# Patient Record
Sex: FEMALE | Race: WHITE | NOT HISPANIC OR LATINO | URBAN - METROPOLITAN AREA
[De-identification: names, ages, dates, MRNs, and addresses within clinical notes are randomized per-mention and may not be internally consistent; named-entity substitution may affect disease eponyms.]

---

## 2017-03-30 ENCOUNTER — HOSPITAL ENCOUNTER (EMERGENCY)
Facility: CLINIC | Age: 32
Discharge: HOME | End: 2017-03-30
Attending: EMERGENCY MEDICINE | Admitting: EMERGENCY MEDICINE
Payer: COMMERCIAL

## 2017-03-30 VITALS
OXYGEN SATURATION: 97 % | DIASTOLIC BLOOD PRESSURE: 87 MMHG | SYSTOLIC BLOOD PRESSURE: 132 MMHG | RESPIRATION RATE: 18 BRPM | TEMPERATURE: 98 F | WEIGHT: 115.5 LBS | HEART RATE: 138 BPM

## 2017-03-30 DIAGNOSIS — F11.20 UNCOMPLICATED OPIOID DEPENDENCE (H): ICD-10-CM

## 2017-03-30 LAB
AMPHETAMINES UR QL SCN: NORMAL
ANION GAP SERPL CALCULATED.3IONS-SCNC: 8 MMOL/L (ref 3–14)
BARBITURATES UR QL: NORMAL
BENZODIAZ UR QL: NORMAL
BUN SERPL-MCNC: 9 MG/DL (ref 7–30)
CALCIUM SERPL-MCNC: 9.6 MG/DL (ref 8.5–10.1)
CANNABINOIDS UR QL SCN: NORMAL
CHLORIDE SERPL-SCNC: 108 MMOL/L (ref 94–109)
CK SERPL-CCNC: 49 U/L (ref 30–225)
CO2 SERPL-SCNC: 27 MMOL/L (ref 20–32)
COCAINE UR QL: NORMAL
CREAT SERPL-MCNC: 0.64 MG/DL (ref 0.52–1.04)
ETHANOL UR QL SCN: NORMAL
GFR SERPL CREATININE-BSD FRML MDRD: NORMAL ML/MIN/1.7M2
GLUCOSE SERPL-MCNC: 96 MG/DL (ref 70–99)
HCG SERPL QL: NEGATIVE
HCG UR QL: NEGATIVE
OPIATES UR QL SCN: NORMAL
POTASSIUM SERPL-SCNC: 3.5 MMOL/L (ref 3.4–5.3)
SODIUM SERPL-SCNC: 143 MMOL/L (ref 133–144)

## 2017-03-30 PROCEDURE — 96361 HYDRATE IV INFUSION ADD-ON: CPT | Mod: 59

## 2017-03-30 PROCEDURE — 80320 DRUG SCREEN QUANTALCOHOLS: CPT | Performed by: EMERGENCY MEDICINE

## 2017-03-30 PROCEDURE — 96375 TX/PRO/DX INJ NEW DRUG ADDON: CPT

## 2017-03-30 PROCEDURE — 80307 DRUG TEST PRSMV CHEM ANLYZR: CPT | Performed by: EMERGENCY MEDICINE

## 2017-03-30 PROCEDURE — 99284 EMERGENCY DEPT VISIT MOD MDM: CPT | Mod: 25

## 2017-03-30 PROCEDURE — 80048 BASIC METABOLIC PNL TOTAL CA: CPT | Performed by: EMERGENCY MEDICINE

## 2017-03-30 PROCEDURE — 25000132 ZZH RX MED GY IP 250 OP 250 PS 637: Performed by: EMERGENCY MEDICINE

## 2017-03-30 PROCEDURE — S0166 INJ OLANZAPINE 2.5MG: HCPCS | Performed by: EMERGENCY MEDICINE

## 2017-03-30 PROCEDURE — 82550 ASSAY OF CK (CPK): CPT | Performed by: EMERGENCY MEDICINE

## 2017-03-30 PROCEDURE — 99284 EMERGENCY DEPT VISIT MOD MDM: CPT | Mod: Z6 | Performed by: EMERGENCY MEDICINE

## 2017-03-30 PROCEDURE — 81025 URINE PREGNANCY TEST: CPT | Performed by: EMERGENCY MEDICINE

## 2017-03-30 PROCEDURE — 25000125 ZZHC RX 250: Performed by: EMERGENCY MEDICINE

## 2017-03-30 PROCEDURE — 25000128 H RX IP 250 OP 636: Performed by: EMERGENCY MEDICINE

## 2017-03-30 PROCEDURE — 96374 THER/PROPH/DIAG INJ IV PUSH: CPT

## 2017-03-30 PROCEDURE — 84703 CHORIONIC GONADOTROPIN ASSAY: CPT | Performed by: EMERGENCY MEDICINE

## 2017-03-30 RX ORDER — IBUPROFEN 600 MG/1
600 TABLET, FILM COATED ORAL ONCE
Status: COMPLETED | OUTPATIENT
Start: 2017-03-30 | End: 2017-03-30

## 2017-03-30 RX ORDER — CLONIDINE HYDROCHLORIDE 0.1 MG/1
0.1 TABLET ORAL 2 TIMES DAILY
Qty: 4 TABLET | Refills: 1 | Status: SHIPPED | OUTPATIENT
Start: 2017-03-30 | End: 2017-05-01

## 2017-03-30 RX ORDER — ONDANSETRON 2 MG/ML
8 INJECTION INTRAMUSCULAR; INTRAVENOUS EVERY 30 MIN PRN
Status: DISCONTINUED | OUTPATIENT
Start: 2017-03-30 | End: 2017-03-30 | Stop reason: HOSPADM

## 2017-03-30 RX ORDER — OLANZAPINE 10 MG/2ML
10 INJECTION, POWDER, FOR SOLUTION INTRAMUSCULAR ONCE
Status: COMPLETED | OUTPATIENT
Start: 2017-03-30 | End: 2017-03-30

## 2017-03-30 RX ORDER — ACETAMINOPHEN 325 MG/1
975 TABLET ORAL ONCE
Status: COMPLETED | OUTPATIENT
Start: 2017-03-30 | End: 2017-03-30

## 2017-03-30 RX ORDER — ONDANSETRON 4 MG/1
4 TABLET, ORALLY DISINTEGRATING ORAL EVERY 6 HOURS PRN
Qty: 10 TABLET | Refills: 0 | Status: SHIPPED | OUTPATIENT
Start: 2017-03-30 | End: 2017-04-02

## 2017-03-30 RX ADMIN — IBUPROFEN 600 MG: 600 TABLET ORAL at 19:16

## 2017-03-30 RX ADMIN — SODIUM CHLORIDE 1000 ML: 9 INJECTION, SOLUTION INTRAVENOUS at 19:16

## 2017-03-30 RX ADMIN — ONDANSETRON 8 MG: 2 INJECTION INTRAMUSCULAR; INTRAVENOUS at 19:17

## 2017-03-30 RX ADMIN — ACETAMINOPHEN 975 MG: 325 TABLET, FILM COATED ORAL at 19:16

## 2017-03-30 RX ADMIN — OLANZAPINE 10 MG: 10 INJECTION, POWDER, LYOPHILIZED, FOR SOLUTION INTRAMUSCULAR at 19:25

## 2017-03-30 NOTE — ED NOTES
Patient presents with elevated HR, generalized muscle aches, nausea and restlessness and states she is withdrawing from Heroine; patient stated she last used at 6:00am today; patient verbalized she started using Heroine 1 year ago; denies all other drug use and denies alcohol use; patient verbalized history of anxiety and depressing and denies suicidal thoughts; continuous cardiac monitor and pulse oxymeter applied.

## 2017-03-30 NOTE — ED AVS SNAPSHOT
[facility], [place], Emergency Department    [address]    [facility] [place] [place] [address]    Phone:  [phone]    Fax:  [phone]                                       Karina Jenkins   MRN: [id number]    Department:  [facility], Emergency Department   Date of Visit:  3/30/2017           After Visit Summary Signature Page     I have received my discharge instructions, and my questions have been answered. I have discussed any challenges I see with this plan with the nurse or doctor.    ..........................................................................................................................................  Patient/Patient Representative Signature      ..........................................................................................................................................  Patient Representative Print Name and Relationship to Patient    ..................................................               ................................................  Date                                            Time    ..........................................................................................................................................  Reviewed by Signature/Title    ...................................................              ..............................................  Date                                                            Time

## 2017-03-30 NOTE — ED AVS SNAPSHOT
[facility], Emergency Department    [address]    [place] [address]    Phone:  [phone]    Fax:  [phone]                                       Karina Jenkins   MRN: [id number]    Department:  [facility], Emergency Department   Date of Visit:  3/30/2017           Patient Information     Date Of Birth          1985        Your diagnoses for this visit were:     Uncomplicated opioid dependence (H)        You were seen by Baltazar Trujillo MD.        Discharge Instructions       You came to the ED with heroin withdrawal symptoms.  We had no detox beds available tonight.    Call the [facility] center line at the below number tomorrow morning at 0830.  See if they can save a detox bed for you.    [facility] (alcohol and narcotics) [phone]  Take zofran every 6 hours as needed for vomiting    Take clonidine twice a day as needed for anxiety and shakjes  Rule 25, Detox and Treatment Resources  FUNDING FOR DETOX AND/OR TREATMENT  If you cannot afford treatment, or if your insurance (i.e. Medical Assistance, [facility], [facility]) does not pay for treatment, you will need a Rule 25 assessment.      Contact the number based on which county you live in to obtain a Rule 25 Assessment.   [facility]  [phone]  [facility] [phone]  [facility] [phone]  [facility]  -- [address]. [phone]  -- [facility] [phone]  --  Family Services [phone]  -- [facility] [phone]  -- CLUES (se habla español) [phone]  [facility] [phone]  -- CLUES (se habla español) [phone]  [facility] [phone]  [facility] [phone]    DETOX CENTERS   Withdrawal from alcohol or benzos can lead to medical emergencies.   Detoxing is a 2-3 day stay at a facility where you can safely stop using your drug of choice.   [facility] (alcohol and narcotics) [phone]   **Call to reserve a bed before  coming in to the [facility].   [facility] (alcohol only) [phone]  [facility] (alcohol only) [phone]  [facility] (all substances) [phone]  [facility] (all substances) [phone]   TREATMENT FACILITIES  Programs that help people face their addictions and develop new tools to move forward.   Must be fully detoxed before entering.   For Adults For Minors   [employer] [phone]  [facility] [phone]  [facility] [phone]  [facility] [phone]  [place] [facility] [phone]  [facility] [phone]  [facility] [phone]  [facility] [phone]  [facility] [phone]  [place] [phone]  [place] [phone]  [facility] [phone]  [address] [phone]  [facility] (LGBT focus)               [phone]  [facility] (cognitive-impairment)      [phone]   [facility] [phone]  [place] [phone]  [facility] [phone]  [facility] [phone]  [facility] [phone]  [place] [phone]  [place] [phone]  [place] [phone]  [facility] [phone]    [place] [facility] Comprehensive Assessment program  [phone]         Please make an appointment to follow up with [facility] (phone: [phone]) if you need a primary care doctor        24 Hour Appointment Hotline       To make an appointment at any [facility] clinic, call [phone] ([phone]). If you don't have a family doctor or clinic, we will help you find one. [facility] clinics are conveniently located to serve the needs of you and your family.             Review of your medicines      START taking        Dose / Directions Last dose taken    cloNIDine 0.1 MG tablet   Commonly known as:  CATAPRES   Dose:  0.1 mg   Quantity:  4 tablet        Take 1 tablet (0.1 mg) by mouth 2 times daily   Refills:  1        ondansetron 4 MG ODT tab   Commonly known as:  ZOFRAN ODT   Dose:  4 mg   Quantity:  10 tablet        Take 1 tablet (4  "mg) by mouth every 6 hours as needed for nausea   Refills:  0          Our records show that you are taking the medicines listed below. If these are incorrect, please call your family doctor or clinic.        Dose / Directions Last dose taken    PREVACID PO        Refills:  0        ZOFRAN PO        Refills:  0                Prescriptions were sent or printed at these locations (2 Prescriptions)                   Other Prescriptions                Printed at Department/Unit printer (2 of 2)         ondansetron (ZOFRAN ODT) 4 MG ODT tab               cloNIDine (CATAPRES) 0.1 MG tablet                Procedures and tests performed during your visit     Basic metabolic panel    CK total    Drug abuse screen 6 urine (chem dep)    HCG qualitative    HCG qualitative urine      Orders Needing Specimen Collection     None      Pending Results     No orders found from 3/28/2017 to 3/31/2017.            Pending Culture Results     No orders found from 3/28/2017 to 3/31/2017.            Thank you for choosing [facility]       Thank you for choosing [facility] for your care. Our goal is always to provide you with excellent care. Hearing back from our patients is one way we can continue to improve our services. Please take a few minutes to complete the written survey that you may receive in the mail after you visit with us. Thank you!        [employer] Information     [employer] lets you send messages to your doctor, view your test results, renew your prescriptions, schedule appointments and more. To sign up, go to [email or web address] . Click on \"Log in\" on the left side of the screen, which will take you to the Welcome page. Then click on \"Sign up Now\" on the right side of the page.     You will be asked to enter the access code listed below, as well as some personal information. Please follow the directions to create your username and password.     Your access code is: [email or web address]  Expires: 6/28/2017  9:42 PM     Your access code " will  in 90 days. If you need help or a new code, please call your [facility] clinic or [phone].        Care EveryWhere ID     This is your Care EveryWhere ID. This could be used by other organizations to access your [facility] medical records  [id number]        After Visit Summary       This is your record. Keep this with you and show to your community pharmacist(s) and doctor(s) at your next visit.

## 2017-03-30 NOTE — ED PROVIDER NOTES
History     Chief Complaint   Patient presents with     Addiction Problem     Pt c/o having withdrawl from opiate. Pt want detox and treatment     HPI  Karina Jenkins is a 31 year old female with history of depression and anxiety who presents to the ED today seeking detox from heroin. Patient reports 1 gram of daily heroin use via snorting a smoking for the past 1 year. Last use was 6 AM. She denies IV drug use. She denies any other drug or alcohol use. She does smoke cigarettes. She denies previous detox or CD treatment admissions. Here in the ED she endorses body aches, restlessness, nausea, hot and cold chills secondary to withdrawal. She denies any skin rashes or sores. Patient denies history of DM.  No fevers.  No hx of IVDU related infections.  No specific back pains, just aches everywhere.  No cough/CP/SOB.    I have reviewed the Medications, Allergies, Past Medical and Surgical History, and Social History in the [facility] system.    PAST MEDICAL HISTORY:   Past Medical History:   Diagnosis Date     Allergic state      Back pain      Migraine        PAST SURGICAL HISTORY: History reviewed. No pertinent surgical history.    FAMILY HISTORY: No family history on file.    SOCIAL HISTORY:   Social History   Substance Use Topics     Smoking status: Current Every Day Smoker     Packs/day: 0.50     Smokeless tobacco: Not on file     Alcohol use Yes     No current facility-administered medications for this encounter.      Current Outpatient Prescriptions   Medication     ondansetron (ZOFRAN ODT) 4 MG ODT tab     cloNIDine (CATAPRES) 0.1 MG tablet     Lansoprazole (PREVACID PO)     Ondansetron HCl (ZOFRAN PO)        Allergies   Allergen Reactions     Vicodin [Hydrocodone-Acetaminophen] Itching         Review of Systems   Constitutional: Positive for chills. Negative for fever.   HENT: Positive for rhinorrhea. Negative for congestion and sore throat.    Eyes: Negative for visual disturbance.   Respiratory: Negative  for cough and shortness of breath.    Cardiovascular: Negative for chest pain.   Gastrointestinal: Positive for nausea. Negative for abdominal pain, diarrhea and vomiting.   Genitourinary: Negative for flank pain.   Musculoskeletal: Positive for myalgias. Negative for joint swelling.   Skin: Negative for rash.   Neurological: Negative for syncope and headaches.   Psychiatric/Behavioral: The patient is nervous/anxious.    All other systems reviewed and are negative.      Physical Exam   BP: 120/88  Pulse: 138  Temp: 99.4  F (37.4  C)  Resp: 16  Weight: 52.4 kg (115 lb 8 oz)  SpO2: 100 %  Physical Exam   Constitutional: She is oriented to person, place, and time. She appears well-developed and well-nourished. No distress.   HENT:   Head: Normocephalic and atraumatic.   Mouth/Throat: Oropharynx is clear and moist. No oropharyngeal exudate.   Eyes: Conjunctivae and EOM are normal. Pupils are equal, round, and reactive to light.   Neck: Normal range of motion. Neck supple.   Cardiovascular: Regular rhythm, normal heart sounds and intact distal pulses.    No murmur heard.  tachycardic   Pulmonary/Chest: Effort normal and breath sounds normal. No respiratory distress. She has no wheezes. She has no rales.   Abdominal: Soft. Bowel sounds are normal. She exhibits no distension. There is no tenderness. There is no rebound and no guarding.   No CVAT   Musculoskeletal: Normal range of motion. She exhibits no edema or tenderness.   Neurological: She is alert and oriented to person, place, and time. She exhibits normal muscle tone.   Normal senstation/strength in legs   Skin: Skin is warm and dry. She is not diaphoretic.   Psychiatric: Her behavior is normal. Judgment and thought content normal. Her mood appears anxious.   Nursing note and vitals reviewed.      ED Course     ED Course     Procedures       6:50 PM  The patient was seen and examined by Dr. Trujillo in Room 20.         Critical Care time:             Labs Ordered  and Resulted from Time of ED Arrival Up to the Time of Departure from the ED   BASIC METABOLIC PANEL   CK TOTAL   HCG QUALITATIVE   DRUG ABUSE SCREEN 6 CHEM DEP URINE ([facility])   HCG QUALITATIVE URINE       Assessments & Plan (with Medical Decision Making)     31 year old female with history of depression and anxiety who presents to the ED today seeking detox from heroin.   Afebrile.  Tachycardic on presentation, though HR ~ 120 on my exam.  Concern for opioid withdrawal vs dehydration vs sepsis vs occult infection.  No s/s of PNA, UTI, epidural abscess, or other infection at this time.  Labs revealeed no AGMA to suggest lactic acidosis.  Tachycardia rapidly improved with fluids.  No electrolyte abnormalities.  CK normal doubt rhabdo.  Patient's symptoms improved with zofran, APAP, motrin, and zyprexa.  No inpatient detox beds available.  Patient's symptoms improved and she was discharged.  She was given contact information for detox centers.  Left int he company of her boyfriend.      ddx- as above    I have reviewed the nursing notes.    I have reviewed the findings, diagnosis, plan and need for follow up with the patient.    Discharge Medication List as of 3/30/2017  9:43 PM      START taking these medications    Details   ondansetron (ZOFRAN ODT) 4 MG ODT tab Take 1 tablet (4 mg) by mouth every 6 hours as needed for nausea, Disp-10 tablet, R-0, Local Print      cloNIDine (CATAPRES) 0.1 MG tablet Take 1 tablet (0.1 mg) by mouth 2 times daily, Disp-4 tablet, R-1, Local Print             Final diagnoses:   Uncomplicated opioid dependence (H)     I,Roman Crouch , am serving as a trained medical scribe to document services personally performed by Baltazar Trujillo MD, based on the provider's statements to me.   IBaltazar MD, was physically present and have reviewed and verified the accuracy of this note documented by Roman Crouch.    3/30/2017   [facility], [place], EMERGENCY DEPARTMENT     Baltazar Trujillo,  MD  03/31/17 0142

## 2017-03-31 ASSESSMENT — ENCOUNTER SYMPTOMS
CHILLS: 1
FLANK PAIN: 0
HEADACHES: 0
JOINT SWELLING: 0
VOMITING: 0
SHORTNESS OF BREATH: 0
DIARRHEA: 0
COUGH: 0
SORE THROAT: 0
FEVER: 0
RHINORRHEA: 1
NERVOUS/ANXIOUS: 1
MYALGIAS: 1
NAUSEA: 1
ABDOMINAL PAIN: 0

## 2017-03-31 NOTE — DISCHARGE INSTRUCTIONS
You came to the ED with heroin withdrawal symptoms.  We had no detox beds available tonight.    Call the [facility] center line at the below number tomorrow morning at 0830.  See if they can save a detox bed for you.    [facility] (alcohol and narcotics) [phone]  Take zofran every 6 hours as needed for vomiting    Take clonidine twice a day as needed for anxiety and shakjes  Rule 25, Detox and Treatment Resources  FUNDING FOR DETOX AND/OR TREATMENT  If you cannot afford treatment, or if your insurance (i.e. Medical Assistance, [facility], [facility]) does not pay for treatment, you will need a Rule 25 assessment.      Contact the number based on which county you live in to obtain a Rule 25 Assessment.   [facility]  [phone]  [facility] [phone]  [facility] [phone]  [facility]  -- [address] [facility] [phone]  -- [facility] [phone]  --  Family Services [phone]  -- [facility] [phone]  -- CLUES (se habla español) [phone]  [facility] [phone]  -- CLUES (se habla español) [phone]  [facility] [phone]  [facility] [phone]    DETOX CENTERS   Withdrawal from alcohol or benzos can lead to medical emergencies.   Detoxing is a 2-3 day stay at a facility where you can safely stop using your drug of choice.   [facility] (alcohol and narcotics) [phone]   **Call to reserve a bed before coming in to the [facility].   [facility] (alcohol only) [phone]  [facility] (alcohol only) [phone]  [facility] (all substances) [phone]  [facility] (all substances) [phone]   TREATMENT FACILITIES  Programs that help people face their addictions and develop new tools to move forward.   Must be fully detoxed before entering.   For Adults For Minors   [place] [phone]  [facility] [phone]  [place] [phone]  [facility] [phone]  [place]  [facility] [phone]  [facility] [phone]  [facility] [phone]  [place] [phone]  [facility] [phone]  [place] [phone]  [place] [phone]  [facility] [phone]  [place] [phone]  [facility] (LGBT focus)               [phone]  [facility] (cognitive-impairment)      [phone]   [facility] [phone]  [place] [phone]  [facility] [phone]  [facility] [phone]  [facility] [phone]  [place] [phone]  [place] [phone]  [place] [phone]  [facility] [phone]    [place] [facility] Comprehensive Assessment program  [phone]         Please make an appointment to follow up with [facility] (phone: [phone]) if you need a primary care doctor
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
## 2017-04-28 ENCOUNTER — OFFICE VISIT (OUTPATIENT)
Dept: FAMILY MEDICINE | Facility: CLINIC | Age: 32
End: 2017-04-28
Payer: COMMERCIAL

## 2017-04-28 ENCOUNTER — RESULT FOLLOW UP (OUTPATIENT)
Dept: FAMILY MEDICINE | Facility: CLINIC | Age: 32
End: 2017-04-28

## 2017-04-28 VITALS
SYSTOLIC BLOOD PRESSURE: 119 MMHG | OXYGEN SATURATION: 99 % | BODY MASS INDEX: 21.75 KG/M2 | TEMPERATURE: 98.2 F | WEIGHT: 127.4 LBS | HEART RATE: 94 BPM | HEIGHT: 64 IN | DIASTOLIC BLOOD PRESSURE: 78 MMHG

## 2017-04-28 DIAGNOSIS — Z00.00 ROUTINE GENERAL MEDICAL EXAMINATION AT A HEALTH CARE FACILITY: Primary | ICD-10-CM

## 2017-04-28 DIAGNOSIS — Z72.0 TOBACCO USE: ICD-10-CM

## 2017-04-28 DIAGNOSIS — N87.0 DYSPLASIA OF CERVIX, LOW GRADE (CIN 1): ICD-10-CM

## 2017-04-28 DIAGNOSIS — Z13.220 LIPID SCREENING: ICD-10-CM

## 2017-04-28 DIAGNOSIS — F19.10 MULTIPLE SUBSTANCE ABUSE (H): ICD-10-CM

## 2017-04-28 DIAGNOSIS — G89.4 CHRONIC PAIN SYNDROME: ICD-10-CM

## 2017-04-28 DIAGNOSIS — Z12.4 CERVICAL CANCER SCREENING: ICD-10-CM

## 2017-04-28 DIAGNOSIS — Z13.1 SCREENING FOR DIABETES MELLITUS: ICD-10-CM

## 2017-04-28 PROBLEM — F11.20 HEROIN ADDICTION (H): Status: ACTIVE | Noted: 2017-04-28

## 2017-04-28 LAB
CHOLEST SERPL-MCNC: 195 MG/DL
GLUCOSE SERPL-MCNC: 91 MG/DL (ref 70–99)
HDLC SERPL-MCNC: 70 MG/DL
LDLC SERPL CALC-MCNC: 96 MG/DL
NONHDLC SERPL-MCNC: 125 MG/DL
TRIGL SERPL-MCNC: 144 MG/DL

## 2017-04-28 PROCEDURE — 36415 COLL VENOUS BLD VENIPUNCTURE: CPT | Performed by: FAMILY MEDICINE

## 2017-04-28 PROCEDURE — G0145 SCR C/V CYTO,THINLAYER,RESCR: HCPCS | Performed by: FAMILY MEDICINE

## 2017-04-28 PROCEDURE — 99214 OFFICE O/P EST MOD 30 MIN: CPT | Mod: 25 | Performed by: FAMILY MEDICINE

## 2017-04-28 PROCEDURE — G0124 SCREEN C/V THIN LAYER BY MD: HCPCS | Performed by: FAMILY MEDICINE

## 2017-04-28 PROCEDURE — 80061 LIPID PANEL: CPT | Performed by: FAMILY MEDICINE

## 2017-04-28 PROCEDURE — 87624 HPV HI-RISK TYP POOLED RSLT: CPT | Performed by: FAMILY MEDICINE

## 2017-04-28 PROCEDURE — 82947 ASSAY GLUCOSE BLOOD QUANT: CPT | Performed by: FAMILY MEDICINE

## 2017-04-28 PROCEDURE — 99385 PREV VISIT NEW AGE 18-39: CPT | Performed by: FAMILY MEDICINE

## 2017-04-28 RX ORDER — GABAPENTIN 300 MG/1
CAPSULE ORAL
Qty: 90 CAPSULE | Refills: 3 | Status: SHIPPED | OUTPATIENT
Start: 2017-04-28 | End: 2017-12-08

## 2017-04-28 ASSESSMENT — ANXIETY QUESTIONNAIRES
3. WORRYING TOO MUCH ABOUT DIFFERENT THINGS: NEARLY EVERY DAY
IF YOU CHECKED OFF ANY PROBLEMS ON THIS QUESTIONNAIRE, HOW DIFFICULT HAVE THESE PROBLEMS MADE IT FOR YOU TO DO YOUR WORK, TAKE CARE OF THINGS AT HOME, OR GET ALONG WITH OTHER PEOPLE: NOT DIFFICULT AT ALL
6. BECOMING EASILY ANNOYED OR IRRITABLE: NEARLY EVERY DAY
GAD7 TOTAL SCORE: 17
1. FEELING NERVOUS, ANXIOUS, OR ON EDGE: MORE THAN HALF THE DAYS
2. NOT BEING ABLE TO STOP OR CONTROL WORRYING: MORE THAN HALF THE DAYS
5. BEING SO RESTLESS THAT IT IS HARD TO SIT STILL: MORE THAN HALF THE DAYS
7. FEELING AFRAID AS IF SOMETHING AWFUL MIGHT HAPPEN: MORE THAN HALF THE DAYS

## 2017-04-28 ASSESSMENT — PATIENT HEALTH QUESTIONNAIRE - PHQ9: 5. POOR APPETITE OR OVEREATING: NEARLY EVERY DAY

## 2017-04-28 NOTE — LETTER
[facility]  [address] [place] [address]  [phone]        May 1, 2017      Karina Jenkins  [address] [place] [address]        Ms. Jenkins       Your recent labs showed:     Normal fasting blood glucose. This means that you have no evidence of diabetes at this time.     Cholesterol panel was normal as well.         Results for orders placed or performed in visit on 04/28/17   Glucose   Result Value Ref Range    Glucose 91 70 - 99 mg/dL   Lipid Profile   Result Value Ref Range    Cholesterol 195 <200 mg/dL    Triglycerides 144 <150 mg/dL    HDL Cholesterol 70 >49 mg/dL    LDL Cholesterol Calculated 96 <100 mg/dL    Non HDL Cholesterol 125 <130 mg/dL           If you have any questions or concerns, please call myself or my nurse at [phone].    Sincerely,    /cassy

## 2017-04-28 NOTE — MR AVS SNAPSHOT
After Visit Summary   4/28/2017    Karina Jenkins    MRN: [id number]           Patient Information     Date Of Birth          1985        Visit Information        Provider Department      4/28/2017 1:30 PM Kelly Perez MD [facility]        Today's Diagnoses     Routine general medical examination at a health care facility    -  1    Cervical cancer screening        Lipid screening        Screening for diabetes mellitus        Chronic pain syndrome        Multiple substance abuse        Tobacco use          Care Instructions      Preventive Health Recommendations  Female Ages 26 - 39  Yearly exam:   See your health care provider every year in order to    Review health changes.     Discuss preventive care.      Review your medicines if you your doctor has prescribed any.    Until age 30: Get a Pap test every three years (more often if you have had an abnormal result).    After age 30: Talk to your doctor about whether you should have a Pap test every 3 years or have a Pap test with HPV screening every 5 years.   You do not need a Pap test if your uterus was removed (hysterectomy) and you have not had cancer.  You should be tested each year for STDs (sexually transmitted diseases), if you're at risk.   Talk to your provider about how often to have your cholesterol checked.  If you are at risk for diabetes, you should have a diabetes test (fasting glucose).  Shots: Get a flu shot each year. Get a tetanus shot every 10 years.   Nutrition:     Eat at least 5 servings of fruits and vegetables each day.    Eat whole-grain bread, whole-wheat pasta and brown rice instead of white grains and rice.    Talk to your provider about Calcium and Vitamin D.     Lifestyle    Exercise at least 150 minutes a week (30 minutes a day, 5 days of the week). This will help you control your weight and prevent disease.    Limit alcohol to one drink per day.    No smoking.     Wear sunscreen to prevent  skin cancer.    See your dentist every six months for an exam and cleaning.          Follow-ups after your visit        Additional Services     PAIN MANAGEMENT CENTER ([place]) REFERRAL       Your provider has referred you to the [facility].    Reason for Referral: Comprehensive Evaluation and Management    Please complete the following questions:    What is your diagnosis for the patient's pain? Chronic pain syndrome    Do you have any specific questions for the pain specialist? No    Are there any red flags that may impact the assessment or management of the patient? Active or recent alcohol, drug or prescription medication misuse (explain): heroin addiction and marijuana use    **ANY DIAGNOSTIC TESTS THAT ARE NOT IN EPIC SHOULD BE SENT TO THE PAIN CENTER**    Please note the Pre-Op Pain Consult must be scheduled 2-3 weeks prior to the patient's surgery.  Patient's trying to schedule within 2 weeks of surgery may not be accommodated.     Pre-Op Pain Consults are only good for 30 days.    REGARDING OPIOID MEDICATIONS:  We will always address appropriateness of opioid pain medications, but we generally will not automatically take on a prescribing role. When we do take on prescribing of opioids for chronic pain, it is in collaboration with the referring physician for an intermediate period of time (months), with an expectation that the primary physician or provider will assume the prescribing role if medications are effective at stable doses with demonstrated compliance.  Therefore, please do not assume that your prescribing responsibilities end on the day of pain clinic consultation.  Is this agreeable to you? YES    For any questions, contact the [facility] at [phone].    Please be aware that coverage of these services is subject to the terms and limitations of your health insurance plan.  Call member services at your health plan with any benefit or coverage  "questions.      Please bring the following with you to your appointment:    (1) Any X-Rays, CTs or MRIs which have been performed.  Contact the facility where they were done to arrange for  prior to your scheduled appointment.    (2) List of current medications   (3) This referral request   (4) Any documents/labs given to you for this referral                  Follow-up notes from your care team     Return in about 1 year (around 2018) for Physical Exam and as needed throughout the year.      Who to contact     Normal or non-critical lab and imaging results will be communicated to you by Zenaminshart, letter or phone within 4 business days after the clinic has received the results. If you do not hear from us within 7 days, please contact the clinic through iTwint or phone. If you have a critical or abnormal lab result, we will notify you by phone as soon as possible.  Submit refill requests through [email or web address] or call your pharmacy and they will forward the refill request to us. Please allow 3 business days for your refill to be completed.          If you need to speak with a  for additional information , please call: [phone]             Additional Information About Your Visit        [email or web address] Information     [email or web address] lets you send messages to your doctor, view your test results, renew your prescriptions, schedule appointments and more. To sign up, go to [email or web address] . Click on \"Log in\" on the left side of the screen, which will take you to the Welcome page. Then click on \"Sign up Now\" on the right side of the page.     You will be asked to enter the access code listed below, as well as some personal information. Please follow the directions to create your username and password.     Your access code is: [email or web address]  Expires: 2017  9:42 PM     Your access code will  in 90 days. If you need help or a new code, please call your [place] clinic or [phone].        Care " "EveryWhere ID     This is your Care EveryWhere ID. This could be used by other organizations to access your [facility] medical records  [id number]        Your Vitals Were     Pulse Temperature Height Last Period Pulse Oximetry Breastfeeding?    94 98.2  F (36.8  C) (Tympanic) 5' 3.5\" (1.613 m) 10/08/2016 99% No    BMI (Body Mass Index)                   22.21 kg/m2            Blood Pressure from Last 3 Encounters:   04/28/17 119/78   03/30/17 132/87   07/01/15 128/78    Weight from Last 3 Encounters:   04/28/17 127 lb 6.4 oz (57.8 kg)   03/30/17 115 lb 8 oz (52.4 kg)   07/01/15 126 lb (57.2 kg)              We Performed the Following     Glucose     Lipid Profile     PAIN MANAGEMENT CENTER ([place]) REFERRAL     Pap imaged thin layer screen reflex to HPV if ASCUS - recommend age 25 - 29          Today's Medication Changes          These changes are accurate as of: 4/28/17  2:30 PM.  If you have any questions, ask your nurse or doctor.               Start taking these medicines.        Dose/Directions    gabapentin 300 MG capsule   Commonly known as:  NEURONTIN   Used for:  Chronic pain syndrome   Started by:  Kelyl Perez MD        Take 1 tablet (300 mg) every night for 1-3 days, then 1 tablet twice daily for 1-3 days, then 1 tablet three times daily   Quantity:  90 capsule   Refills:  3            Where to get your medicines      These medications [facility] to [facility] [address] [address] [place] [address]  [address] [facility] [address]    Hours:  24-hours Phone:  [phone]     gabapentin 300 MG capsule                Primary Care Provider    Physician No Ref-Primary       No address on file        Thank you!     Thank you for choosing [facility]  for your care. Our goal is always to provide you with excellent care. Hearing back from our patients is one way we can continue to improve our services. Please take a few minutes to " complete the written survey that you may receive in the mail after your visit with us. Thank you!             Your Updated Medication List - Protect others around you: Learn how to safely use, store and throw away your medicines at www.disposemymeds.org.          This list is accurate as of: 4/28/17  2:30 PM.  Always use your most recent med list.                   Brand Name Dispense Instructions for use    cloNIDine 0.1 MG tablet    CATAPRES    4 tablet    Take 1 tablet (0.1 mg) by mouth 2 times daily       gabapentin 300 MG capsule    NEURONTIN    90 capsule    Take 1 tablet (300 mg) every night for 1-3 days, then 1 tablet twice daily for 1-3 days, then 1 tablet three times daily       PREVACID PO          ZOFRAN PO

## 2017-04-28 NOTE — LETTER
May 30, 2018      Karina Jenkins  [address] [place] [address]    Dear ,      At [facility], your health and wellness is our primary concern. That is why we are following up on a colposcopy from 6/12/17, which was reported as EWELINA 1. Your provider had recommended that you have a Pap smear and HPV test completed by 6/12/18. Our records do not show that this has been scheduled.    It is important to complete the follow up that your provider has suggested for you to ensure that there are no worsening changes which may, over time, develop into cancer.      Please contact our office at  [phone] to schedule an appointment for a Pap smear and HPV test at your earliest convenience. If you have questions or concerns, please call the clinic and we will be happy to assist you.    If you have completed the tests outside of [facility], please have the results forwarded to our office. We will update the chart for your primary Physician to review before your next annual physical.     Thank you for choosing [facility]!    Sincerely,      Kelly Perez MD/radha

## 2017-04-28 NOTE — LETTER
December 12, 2018      Karina Jenkins  [address] [place] [address]    Dear ,      At [facility], your health and wellness is our primary concern. That is why we are following up on a colposcopy from 6/12/17, which was reported as EWELINA 1. Your provider had recommended that you have a Pap smear and HPV test completed by 6/12/18. Our records do not show that this has been done.    It is important to complete the follow up that your provider has suggested for you to ensure that there are no worsening changes which may, over time, develop into cancer.      Please contact our office at  [phone] to schedule an appointment for a Pap smear and HPV test at your earliest convenience. If you have questions or concerns, please call the clinic and we will be happy to assist you.    If you have completed the tests outside of [facility], please have the results forwarded to our office. We will update the chart for your primary Physician to review before your next annual physical.     Thank you for choosing [facility]!    Sincerely,      Kelly Perez MD/[facility]

## 2017-04-28 NOTE — LETTER
July 13, 2017      Karina Mac Kellogg  [address] [address]    Dear ,      This letter is to inform you that your recent Colposcopy biopsy was negative and your ECC results showed EWELINA 1 or mild dysplasia.    You should repeat a pap smear and HPV test in 1 year, unless otherwise directed.      You will also need to return to the clinic every year for your annual exam and other preventive tests.     Please contact the clinic at [phone] if you have further questions.       Sincerely,      Kelly Perez MD/radha

## 2017-04-28 NOTE — NURSING NOTE
"Chief Complaint   Patient presents with     Physical       Initial /78  Pulse 94  Temp 98.2  F (36.8  C) (Tympanic)  Ht 5' 3.5\" (1.613 m)  Wt 127 lb 6.4 oz (57.8 kg)  LMP 10/08/2016  SpO2 99%  Breastfeeding? No  BMI 22.21 kg/m2 Estimated body mass index is 22.21 kg/(m^2) as calculated from the following:    Height as of this encounter: 5' 3.5\" (1.613 m).    Weight as of this encounter: 127 lb 6.4 oz (57.8 kg).  Medication Reconciliation: complete     Forms pt brought into clinic were completed and faxed to number given 5/1/17.  Forms were sent to be scanned into chart.     Trena Miles MA      "

## 2017-04-29 ASSESSMENT — PATIENT HEALTH QUESTIONNAIRE - PHQ9: SUM OF ALL RESPONSES TO PHQ QUESTIONS 1-9: 18

## 2017-04-29 ASSESSMENT — ANXIETY QUESTIONNAIRES: GAD7 TOTAL SCORE: 17

## 2017-05-01 NOTE — PROGRESS NOTES
Please send a result letter on clinic letterhead with results along with the following instructions      Ms. Jenkins      Your recent labs showed:     Normal fasting blood glucose. This means that you have no evidence of diabetes at this time.     Cholesterol panel was normal as well.      Please contact the clinic if you have any additional questions or concerns.    Sincerely,      Kelly Perez M.D    [facility]

## 2017-05-02 ENCOUNTER — TELEPHONE (OUTPATIENT)
Dept: BEHAVIORAL HEALTH | Facility: OTHER | Age: 32
End: 2017-05-02

## 2017-05-02 NOTE — TELEPHONE ENCOUNTER
Writer called pt, no answer. LVM requesting a call back for an appt. Writer will attempt again this week and next week.     Brooklynn Vargas

## 2017-05-02 NOTE — TELEPHONE ENCOUNTER
Please schedule appointment for patient with Addiction Medicine provider for chronic back pain/ongoing opioid use  -please schedule with Dr. Arndt.

## 2017-05-02 NOTE — TELEPHONE ENCOUNTER
----- Message from Felicity Gilbert sent at 5/2/2017  8:58 AM CDT -----  Regarding: Addiction Med Referral  Please review.

## 2017-05-03 LAB
COPATH REPORT: ABNORMAL
PAP: ABNORMAL

## 2017-05-04 LAB
FINAL DIAGNOSIS: ABNORMAL
HPV HR 12 DNA CVX QL NAA+PROBE: POSITIVE
HPV16 DNA SPEC QL NAA+PROBE: NEGATIVE
HPV18 DNA SPEC QL NAA+PROBE: NEGATIVE
SPECIMEN DESCRIPTION: ABNORMAL

## 2017-05-05 NOTE — TELEPHONE ENCOUNTER
Writer spoke with pt; she is scheduled to see Dr Arndt 05/08/17. Writer is closing this encounter.     Brooklynn Vargas

## 2017-05-08 PROBLEM — R87.610 ASCUS WITH POSITIVE HIGH RISK HPV CERVICAL: Status: ACTIVE | Noted: 2017-04-28

## 2017-05-08 PROBLEM — R87.810 ASCUS WITH POSITIVE HIGH RISK HPV CERVICAL: Status: ACTIVE | Noted: 2017-04-28

## 2017-05-08 NOTE — PROGRESS NOTES
4/28/17: ASCUS Pap, + HR HPV (Neg 16/18). Plan colp  5/8/17: Pt notified of result. GYN will call pt to schedule a colp. Pt is being admitted into treatment tomorrow for 30 days.   6/12/17: [place] Bx - negative, ECC - EWELINA 1. Plan cotest in 1 year.   6/14/17 Message left to return call.   6/27/17 Message left on home and mobile to return call.   7/13/17 [place] Result letter sent per RN request (rlm)  5/30/18 Cotest reminder letter sent (rl)  12/12/18 Phone number not in service, additional letter sent ([facility])  12/26/18 Patient is lost to follow-up. Routed to provider as JACI. (rlm)

## 2017-06-12 ENCOUNTER — OFFICE VISIT (OUTPATIENT)
Dept: OBGYN | Facility: CLINIC | Age: 32
End: 2017-06-12
Payer: MEDICAID

## 2017-06-12 VITALS
HEART RATE: 88 BPM | TEMPERATURE: 98.6 F | BODY MASS INDEX: 23.36 KG/M2 | SYSTOLIC BLOOD PRESSURE: 127 MMHG | WEIGHT: 134 LBS | OXYGEN SATURATION: 98 % | DIASTOLIC BLOOD PRESSURE: 88 MMHG

## 2017-06-12 DIAGNOSIS — R87.610 ASCUS WITH POSITIVE HIGH RISK HPV CERVICAL: ICD-10-CM

## 2017-06-12 DIAGNOSIS — R87.810 ASCUS WITH POSITIVE HIGH RISK HPV CERVICAL: ICD-10-CM

## 2017-06-12 DIAGNOSIS — N91.2 ABSENCE OF MENSTRUATION: Primary | ICD-10-CM

## 2017-06-12 LAB — BETA HCG QUAL IFA URINE: NEGATIVE

## 2017-06-12 PROCEDURE — 84703 CHORIONIC GONADOTROPIN ASSAY: CPT | Performed by: OBSTETRICS & GYNECOLOGY

## 2017-06-12 PROCEDURE — 88305 TISSUE EXAM BY PATHOLOGIST: CPT | Performed by: OBSTETRICS & GYNECOLOGY

## 2017-06-12 PROCEDURE — 99242 OFF/OP CONSLTJ NEW/EST SF 20: CPT | Mod: 25 | Performed by: OBSTETRICS & GYNECOLOGY

## 2017-06-12 PROCEDURE — 57454 BX/CURETT OF CERVIX W/SCOPE: CPT | Performed by: OBSTETRICS & GYNECOLOGY

## 2017-06-12 RX ORDER — MAGNESIUM HYDROXIDE/ALUMINUM HYDROXICE/SIMETHICONE 120; 1200; 1200 MG/30ML; MG/30ML; MG/30ML
15 SUSPENSION ORAL EVERY 4 HOURS PRN
COMMUNITY
End: 2018-01-02

## 2017-06-12 NOTE — MR AVS SNAPSHOT
"              After Visit Summary   2017    Karina Victoriaber    MRN: [id number]           Patient Information     Date Of Birth          1985        Visit Information        Provider Department      2017 2:30 PM Agbeh, Cephas Mawuena, MD [facility]        Today's Diagnoses     Absence of menstruation    -  1    ASCUS with positive high risk HPV cervical           Follow-ups after your visit        Who to contact     If you have questions or need follow up information about today's clinic visit or your schedule please contact [facility] directly at [phone].  Normal or non-critical lab and imaging results will be communicated to you by MyChart, letter or phone within 4 business days after the clinic has received the results. If you do not hear from us within 7 days, please contact the clinic through [employer] or phone. If you have a critical or abnormal lab result, we will notify you by phone as soon as possible.  Submit refill requests through [employer] or call your pharmacy and they will forward the refill request to us. Please allow 3 business days for your refill to be completed.          Additional Information About Your Visit        MyChart Information     [employer] Connect lets you send messages to your doctor, view your test results, renew your prescriptions, schedule appointments and more. To sign up, go to [email or web address] . Click on \"Log in\" on the left side of the screen, which will take you to the Welcome page. Then click on \"Sign up Now\" on the right side of the page.     You will be asked to enter the access code listed below, as well as some personal information. Please follow the directions to create your username and password.     Your access code is: [email or web address]  Expires: 2017  9:42 PM     Your access code will  in 90 days. If you need help or a new code, please call your [facility] or [phone].        Care EveryWhere ID     This is your " Care EveryWhere ID. This could be used by other organizations to access your [facility] medical records  [id number]        Your Vitals Were     Pulse Temperature Last Period Pulse Oximetry BMI (Body Mass Index)       88 98.6  F (37  C) (Tympanic) 05/15/2017 98% 23.36 kg/m2        Blood Pressure from Last 3 Encounters:   06/12/17 127/88   04/28/17 119/78   03/30/17 132/87    Weight from Last 3 Encounters:   06/12/17 134 lb (60.8 kg)   04/28/17 127 lb 6.4 oz (57.8 kg)   03/30/17 115 lb 8 oz (52.4 kg)              We Performed the Following     Beta HCG qual IFA urine     COLP CERVIX/UPPER VAGINA W BX CERVIX/ENDOCERV CURETT     Surgical pathology exam        Primary Care Provider    Physician No Ref-Primary       No address on file        Thank you!     Thank you for choosing [facility]  for your care. Our goal is always to provide you with excellent care. Hearing back from our patients is one way we can continue to improve our services. Please take a few minutes to complete the written survey that you may receive in the mail after your visit with us. Thank you!             Your Updated Medication List - Protect others around you: Learn how to safely use, store and throw away your medicines at www.disposemymeds.org.          This list is accurate as of: 6/12/17  3:16 PM.  Always use your most recent med list.                   Brand Name Dispense Instructions for use    alum & mag hydroxide-simethicone 200-200-20 MG/5ML Susp suspension    MYLANTA/MAALOX     Take 15 mLs by mouth every 4 hours as needed for indigestion       BENADRYL PO          BUSPAR PO      Take 15 mg by mouth       DEPAKOTE PO      Take 500 mg by mouth       FLAGYL PO      Take 500 mg by mouth       gabapentin 300 MG capsule    NEURONTIN    90 capsule    Take 1 tablet (300 mg) every night for 1-3 days, then 1 tablet twice daily for 1-3 days, then 1 tablet three times daily       IBUPROFEN PO          MELATONIN PO      10 mg q hs        PRILOSEC PO      Take 20 mg by mouth       ROBAXIN PO      Take 500 mg by mouth 3 times daily       TYLENOL PO          VISTARIL PO      Take 25 mg by mouth 1-2 prn       ZOFRAN PO      Take by mouth every 6 hours as needed for nausea or vomiting       ZOLOFT PO      Take 25 mg by mouth daily

## 2017-06-12 NOTE — PROGRESS NOTES
Patient Name: Karina Jenkins              Date: 6/12/2017   YOB: 1985                         Age: 32 year old   Phone: [phone] (home)   ________________________________________________________________________  I have been asked to see Karina in consultation by Dr. ROBBINS  to discuss the pap smear, findings and possible further evaluation.  The patient's pap smear on 4/17 showed ASCUS and OTHER HIGH RISK HPV.   I attempted to ensure that the patient was educated regarding the nature of her findings and implications to date.  We reviewed the role of HPV, incidence in the population and the natural history of the infection, and its transmission.  We also reviewed ways to minimize her future risk, the effect of HPV on the cervix and treatment options available, should they be indicated.    The pathophysiology of the cervix, including a discussion of the squamous and columnar cells, metaplasia and dysplasia have been reviewed, drawings, sketches and the pamphlets were reviewed with her.      Patient's last menstrual period was 05/15/2017.  Current Birth Control Method: nothing      Past Medical History:   Diagnosis Date     ASCUS with positive high risk HPV cervical 04/28/2017    Neg 16/18     Chronic back pain      Depression with anxiety      GERD (gastroesophageal reflux disease)      Heroin addiction (H)      Marijuana use, episodic      Migraine      Tobacco use     1/2 PPD       Past Surgical History:   Procedure Laterality Date     NO HISTORY OF SURGERY          Outpatient Encounter Prescriptions as of 6/12/2017   Medication Sig Dispense Refill     Sertraline HCl (ZOLOFT PO) Take 25 mg by mouth daily       Omeprazole (PRILOSEC PO) Take 20 mg by mouth       BusPIRone HCl (BUSPAR PO) Take 15 mg by mouth       Divalproex Sodium (DEPAKOTE PO) Take 500 mg by mouth       HydrOXYzine Pamoate (VISTARIL PO) Take 25 mg by mouth 1-2 prn       Ondansetron HCl (ZOFRAN PO) Take by mouth every 6 hours as  needed for nausea or vomiting       MELATONIN PO 10 mg q hs       DiphenhydrAMINE HCl (BENADRYL PO)        IBUPROFEN PO        Acetaminophen (TYLENOL PO)        alum & mag hydroxide-simethicone (MYLANTA/MAALOX) 200-200-20 MG/5ML SUSP suspension Take 15 mLs by mouth every 4 hours as needed for indigestion       Methocarbamol (ROBAXIN PO) Take 500 mg by mouth 3 times daily       MetroNIDAZOLE (FLAGYL PO) Take 500 mg by mouth       gabapentin (NEURONTIN) 300 MG capsule Take 1 tablet (300 mg) every night for 1-3 days, then 1 tablet twice daily for 1-3 days, then 1 tablet three times daily (Patient not taking: Reported on 6/12/2017) 90 capsule 3     No facility-administered encounter medications on file as of 6/12/2017.         Allergies as of 06/12/2017 - [facility] as Reviewed 06/12/2017   Allergen Reaction Noted     Vicodin [hydrocodone-acetaminophen] Itching 07/01/2015       Social History     Social History     Marital status: Single     Spouse name: N/A     Number of children: N/A     Years of education: N/A     Social History Main Topics     Smoking status: Current Every Day Smoker     Packs/day: 0.50     Smokeless tobacco: None     Alcohol use No     Drug use: Yes     Special: Heroin      Comment: heroin     Sexual activity: Yes     Partners: Male     Birth control/ protection: None     Other Topics Concern     None     Social History Narrative        Family History   Problem Relation Age of Onset     Breast Cancer No family hx of      Colon Cancer No family hx of          Review Of Systems  Skin: negative  Eyes: negative  Ears/Nose/Throat: negative  Respiratory: negative  Cardiovascular: negative  Gastrointestinal: negative  Genitourinary: negative  Musculoskeletal: negative  Neurologic: negative  Psychiatric: negative  Hematologic/Lymphatic/Immunologic: negative  Endocrine: negative     Exam:   /88 (BP Location: Left arm, Patient Position: Sitting, Cuff Size: Adult Regular)  Pulse 88  Temp 98.6  F (37  C)  (Tympanic)  Wt 134 lb (60.8 kg)  LMP 05/15/2017  SpO2 98%  BMI 23.36 kg/m2  GENERAL:  WNWD female NAD  HEENT: NC/AT, EOMI  Lungs:  Good respiratory effort   SKIN: normal skin turgor  GAIT: Normal  NECK: Symmetrical, no masses noted   VULVA: Normal Genitalia  BUS: Normal  URETHRA:  No hypermobility noted  URETHRAL MEATUS:  No masses noted  VAGINA: Normal mucosa, no discharge  CERVIX: Closed, mobile, no discharge  PERIANAL:  No masses or lesions seen  EXTREMITIES: no clubbing, cyanosis, or edema    Assessment:  ASCUS/High Risk HPV    Plan:  Recommend to Proceed with Colpo  The details of the colposcopic procedure were reviewed, the risks of missed diagnoses, pain, infection, and bleeding.    Results for orders placed or performed in visit on 06/12/17   Beta HCG qual IFA urine   Result Value Ref Range    Beta HCG Qual IFA Urine Negative NEG       Procedure:  Procedure for colposcopy and biopsy has been explained to the patient and consent obtained.    Before the procedure, it was ensured that the patient was educated regarding the nature of her findings and implications to date.  We reviewed the role of HPV and the natural history of the infection.  We also reviewed ways to minimize her future risk, the effect of HPV on the cervix and treatment options available, should they be indicated.    The pathophysiology of the cervix, including a discussion of the squamous and columnar cells, metaplasia and dysplasia have been reviewed, drawings, sketches and the pamphlets were reviewed with her.  The details of the colposcopic procedure were reviewed, the risks of missed diagnoses, pain, infection, and bleeding.  Questions seemed to be answered before proceeding and the patient then consented to the procedure.     Speculum placed in vagina and excellent visualization of cervix achieved, cervix swabbed  with acetic acid solution.    biopsies taken (including ECC): 3   Hemostasis effected with Monsel's  solution.    Findings:    No images are attached to the encounter.     Cervix: acetowhitening noted 11,14 oclock  Vaginal inspection: vaginal colposcopy not performed.  Vulvar colposcopy: vulvar colposcopy not performed.   Procedure Summary: Patient tolerated procedure well and colposcopy adequate.      Assessment/Plan:    ICD-10-CM    1. Absence of menstruation N91.2 Beta HCG qual IFA urine     Sertraline HCl (ZOLOFT PO)     Omeprazole (PRILOSEC PO)     BusPIRone HCl (BUSPAR PO)     Divalproex Sodium (DEPAKOTE PO)     HydrOXYzine Pamoate (VISTARIL PO)     Ondansetron HCl (ZOFRAN PO)     MELATONIN PO     DiphenhydrAMINE HCl (BENADRYL PO)     IBUPROFEN PO     Acetaminophen (TYLENOL PO)     alum & mag hydroxide-simethicone (MYLANTA/MAALOX) 200-200-20 MG/5ML SUSP suspension     Methocarbamol (ROBAXIN PO)     MetroNIDAZOLE (FLAGYL PO)     Surgical pathology exam     COLP CERVIX/UPPER VAGINA W BX CERVIX/ENDOCERV CURETT   2. ASCUS with positive high risk HPV cervical R87.610 Beta HCG qual IFA urine    R87.810 Sertraline HCl (ZOLOFT PO)     Omeprazole (PRILOSEC PO)     BusPIRone HCl (BUSPAR PO)     Divalproex Sodium (DEPAKOTE PO)     HydrOXYzine Pamoate (VISTARIL PO)     Ondansetron HCl (ZOFRAN PO)     MELATONIN PO     DiphenhydrAMINE HCl (BENADRYL PO)     IBUPROFEN PO     Acetaminophen (TYLENOL PO)     alum & mag hydroxide-simethicone (MYLANTA/MAALOX) 200-200-20 MG/5ML SUSP suspension     Methocarbamol (ROBAXIN PO)     MetroNIDAZOLE (FLAGYL PO)     Surgical pathology exam     COLP CERVIX/UPPER VAGINA W BX CERVIX/ENDOCERV CURETT       Specimens labelled and sent to pathology.  Will base further treatment on pathology findings.  Post biopsy instructions given to patient and call to discuss Pathology results.  TT 30 min, in addition to the time for the procedure  CT greater than 50%, as noted above in the HPI and in the Plan.       CEPHAS AGBEH, MD.

## 2017-06-12 NOTE — NURSING NOTE
"Chief Complaint   Patient presents with     Colposcopy       Initial /88 (BP Location: Left arm, Patient Position: Sitting, Cuff Size: Adult Regular)  Pulse 88  Temp 98.6  F (37  C) (Tympanic)  Wt 134 lb (60.8 kg)  LMP 05/15/2017  SpO2 98%  BMI 23.36 kg/m2 Estimated body mass index is 23.36 kg/(m^2) as calculated from the following:    Height as of 4/28/17: 5' 3.5\" (1.613 m).    Weight as of this encounter: 134 lb (60.8 kg).  Medication Reconciliation: complete     Stefany Munson LPN    "

## 2017-06-14 PROBLEM — N87.0 DYSPLASIA OF CERVIX, LOW GRADE (CIN 1): Status: ACTIVE | Noted: 2017-04-28

## 2017-06-14 LAB — COPATH REPORT: NORMAL

## 2017-08-03 ENCOUNTER — OFFICE VISIT (OUTPATIENT)
Dept: FAMILY MEDICINE | Facility: CLINIC | Age: 32
End: 2017-08-03
Payer: MEDICAID

## 2017-08-03 VITALS
SYSTOLIC BLOOD PRESSURE: 131 MMHG | HEART RATE: 105 BPM | TEMPERATURE: 98.5 F | OXYGEN SATURATION: 99 % | WEIGHT: 135.8 LBS | DIASTOLIC BLOOD PRESSURE: 87 MMHG | BODY MASS INDEX: 23.68 KG/M2

## 2017-08-03 DIAGNOSIS — F99 INSOMNIA DUE TO OTHER MENTAL DISORDER: ICD-10-CM

## 2017-08-03 DIAGNOSIS — F51.05 INSOMNIA DUE TO OTHER MENTAL DISORDER: ICD-10-CM

## 2017-08-03 DIAGNOSIS — R11.0 NAUSEA: ICD-10-CM

## 2017-08-03 DIAGNOSIS — G43.809 OTHER MIGRAINE WITHOUT STATUS MIGRAINOSUS, NOT INTRACTABLE: Primary | ICD-10-CM

## 2017-08-03 PROCEDURE — 99214 OFFICE O/P EST MOD 30 MIN: CPT | Performed by: NURSE PRACTITIONER

## 2017-08-03 RX ORDER — TRAZODONE HYDROCHLORIDE 100 MG/1
100-200 TABLET ORAL AT BEDTIME
Qty: 90 TABLET | Refills: 0 | Status: SHIPPED | OUTPATIENT
Start: 2017-08-03 | End: 2017-12-08

## 2017-08-03 RX ORDER — ALBUTEROL SULFATE 90 UG/1
2 AEROSOL, METERED RESPIRATORY (INHALATION) EVERY 6 HOURS
COMMUNITY

## 2017-08-03 RX ORDER — SUMATRIPTAN 25 MG/1
25-50 TABLET, FILM COATED ORAL
Qty: 18 TABLET | Refills: 1 | Status: SHIPPED | OUTPATIENT
Start: 2017-08-03

## 2017-08-03 RX ORDER — CHOLECALCIFEROL (VITAMIN D3) 50 MCG
1 TABLET ORAL DAILY
COMMUNITY

## 2017-08-03 RX ORDER — TRAZODONE HYDROCHLORIDE 100 MG/1
100 TABLET ORAL AT BEDTIME
COMMUNITY
End: 2017-08-03

## 2017-08-03 RX ORDER — ONDANSETRON 4 MG/1
4 TABLET, FILM COATED ORAL EVERY 12 HOURS PRN
Qty: 18 TABLET | Refills: 0 | Status: SHIPPED | OUTPATIENT
Start: 2017-08-03 | End: 2017-08-17

## 2017-08-03 ASSESSMENT — ANXIETY QUESTIONNAIRES
GAD7 TOTAL SCORE: 15
5. BEING SO RESTLESS THAT IT IS HARD TO SIT STILL: SEVERAL DAYS
IF YOU CHECKED OFF ANY PROBLEMS ON THIS QUESTIONNAIRE, HOW DIFFICULT HAVE THESE PROBLEMS MADE IT FOR YOU TO DO YOUR WORK, TAKE CARE OF THINGS AT HOME, OR GET ALONG WITH OTHER PEOPLE: SOMEWHAT DIFFICULT
6. BECOMING EASILY ANNOYED OR IRRITABLE: NEARLY EVERY DAY
7. FEELING AFRAID AS IF SOMETHING AWFUL MIGHT HAPPEN: MORE THAN HALF THE DAYS
2. NOT BEING ABLE TO STOP OR CONTROL WORRYING: MORE THAN HALF THE DAYS
3. WORRYING TOO MUCH ABOUT DIFFERENT THINGS: NEARLY EVERY DAY
1. FEELING NERVOUS, ANXIOUS, OR ON EDGE: MORE THAN HALF THE DAYS

## 2017-08-03 ASSESSMENT — PATIENT HEALTH QUESTIONNAIRE - PHQ9: 5. POOR APPETITE OR OVEREATING: MORE THAN HALF THE DAYS

## 2017-08-03 NOTE — NURSING NOTE
"Chief Complaint   Patient presents with     Headache     Sleep Problem       Initial /87  Pulse 105  Temp 98.5  F (36.9  C) (Oral)  Wt 135 lb 12.8 oz (61.6 kg)  SpO2 99%  BMI 23.68 kg/m2 Estimated body mass index is 23.68 kg/(m^2) as calculated from the following:    Height as of 4/28/17: 5' 3.5\" (1.613 m).    Weight as of this encounter: 135 lb 12.8 oz (61.6 kg).  Medication Reconciliation: complete     Lois Aparicio MA  "

## 2017-08-03 NOTE — MR AVS SNAPSHOT
"              After Visit Summary   8/3/2017    Karina Jenkins    MRN: [id number]           Patient Information     Date Of Birth          1985        Visit Information        Provider Department      8/3/2017 1:00 PM Nicole Jose NP [facility]        Today's Diagnoses     Other migraine without status migrainosus, not intractable    -  1    Insomnia due to other mental disorder          Care Instructions      Migraine Headache: Stages and Treatment    A migraine headache tends to progress in stages. Learning these stages can help you better understand what is happening. Then you can learn ways to reduce pain and relieve other symptoms. Methods for relieving your symptoms include self-care and medicines.  Migraine stages  Migraines tend to progress through 4 stages. Many people don't have all stages, and stages may differ with each headache:    Prodrome. A few hours to a day or so before the headache, you may feel tired, (yawning many times), uneasy, or dee. You may also feel bloated or crave certain foods.    Aura. Up to an hour before the headache starts, some migraine sufferers experience aura--flashing lights, blind spots, other vision problems, confusion, difficulty speaking, or other neurologic symptoms.    Headache. Moderate to severe pain affects one side of the head and then can spread to both sides, often along with nausea. You may be highly sensitive to light, sound, and odors. Vomiting or diarrhea may also happen. This stage lasts 4 to 72 hours.    Postdrome. After your headache ends, you may feel tired, achy, and \"washed out.\" This may last for a day or so.  Self-care during a migraine  Here is what you can do:    Use a cold compress. Wrap a thin cloth around a cold pack, a cold can of soda, or a bag of frozen vegetables. Apply this to your temple or other pain site.    Drink fluids. If nausea makes it hard to drink, try sucking on ice.    Rest. If possible, lie down. Try not " to bend over, as this may increase your pain. Sometimes laying in a dark quiet room can help the migraine from being aggravated.      Try caffeine. Some people find that drinking fluids with caffeine, such as coffee or tea, helps to lessen migraine pain.  Using medicines  Work with your healthcare provider to find the right medicines for you. Medicines for migraine may relieve pain (analgesics), relieve nausea, or attack the migraine's root causes (migraine-specific medicines).  Rebound headache  Taking analgesics each day, or even several times a week, may lead to more frequent and severe headaches. These are called rebound headaches. If you think you're having rebound headaches, tell your healthcare provider. He or she can help you safely decrease your medicine. Rebound caffeine withdrawal headaches can also happen.    Date Last Reviewed: 10/9/2015    2262-6690 [employer]. [address] [place] [address], [place] [address]. All rights reserved. This information is not intended as a substitute for professional medical care. Always follow your healthcare professional's instructions.                Follow-ups after your visit        Follow-up notes from your care team     Return if symptoms worsen or fail to improve.      Who to contact     Normal or non-critical lab and imaging results will be communicated to you by MyChart, letter or phone within 4 business days after the clinic has received the results. If you do not hear from us within 7 days, please contact the clinic through [employer] or phone. If you have a critical or abnormal lab result, we will notify you by phone as soon as possible.  Submit refill requests through [email or web address] or call your pharmacy and they will forward the refill request to us. Please allow 3 business days for your refill to be completed.          If you need to speak with a  for additional information , please call: [phone]             Additional Information About  Your Visit        Care EveryWhere ID     This is your Care EveryWhere ID. This could be used by other organizations to access your [facility] medical records  [id number]        Your Vitals Were     Pulse Temperature Pulse Oximetry BMI (Body Mass Index)          105 98.5  F (36.9  C) (Oral) 99% 23.68 kg/m2         Blood Pressure from Last 3 Encounters:   08/03/17 131/87   06/12/17 127/88   04/28/17 119/78    Weight from Last 3 Encounters:   08/03/17 135 lb 12.8 oz (61.6 kg)   06/12/17 134 lb (60.8 kg)   04/28/17 127 lb 6.4 oz (57.8 kg)              Today, you had the following     No orders found for display         Today's Medication Changes          These changes are accurate as of: 8/3/17  1:22 PM.  If you have any questions, ask your nurse or doctor.               Start taking these medicines.        Dose/Directions    diclofenac 50 MG EC tablet   Commonly known as:  VOLTAREN   Used for:  Other migraine without status migrainosus, not intractable   Started by:  Nicole Jose NP        Dose:  50 mg   Take 1 tablet (50 mg) by mouth 2 times daily as needed for moderate pain or other (Migraine)   Quantity:  30 tablet   Refills:  1       SUMAtriptan 25 MG tablet   Commonly known as:  IMITREX   Used for:  Other migraine without status migrainosus, not intractable   Started by:  Nicole Jose NP        Dose:  25-50 mg   Take 1-2 tablets (25-50 mg) by mouth at onset of headache for migraine May repeat in 2 hours. Max 8 tablets/24 hours.   Quantity:  18 tablet   Refills:  1         These medicines have changed or have updated prescriptions.        Dose/Directions    traZODone 100 MG tablet   Commonly known as:  DESYREL   This may have changed:  how much to take   Used for:  Insomnia due to other mental disorder   Changed by:  Nicole Jose NP        Dose:  100-200 mg   Take 1-2 tablets (100-200 mg) by mouth At Bedtime   Quantity:  90 tablet   Refills:  0            Where to get your medicines      These  medications [id number] [address] AT [place] [address], [place] [facility] [address]     Phone:  [phone]     diclofenac 50 MG EC tablet    SUMAtriptan 25 MG tablet    traZODone 100 MG tablet                Primary Care Provider    Physician No Ref-Primary       No address on file        Equal Access to Services     [facility] : Hadii aad ku hadasho Soomaali, waaxda luqadaha, qaybta kaalmada adeegyada, waxay idiin hayaan adeeg khkellysh laSyedakirby ah. So [facility] [phone].    ATENCIÓN: Si habla español, tiene a cortez disposición servicios gratuitos de asistencia lingüística. Miguel al [phone].    We comply with applicable federal civil rights laws and [place] laws. We do not discriminate on the basis of race, color, national origin, age, disability sex, sexual orientation or gender identity.            Thank you!     Thank you for choosing [facility]  for your care. Our goal is always to provide you with excellent care. Hearing back from our patients is one way we can continue to improve our services. Please take a few minutes to complete the written survey that you may receive in the mail after your visit with us. Thank you!             Your Updated Medication List - Protect others around you: Learn how to safely use, store and throw away your medicines at www.disposemymeds.org.          This list is accurate as of: 8/3/17  1:22 PM.  Always use your most recent med list.                   Brand Name Dispense Instructions for use Diagnosis    albuterol 108 (90 BASE) MCG/ACT Inhaler    PROAIR HFA/PROVENTIL HFA/VENTOLIN HFA     Inhale 2 puffs into the lungs every 6 hours        alum & mag hydroxide-simethicone 200-200-20 MG/5ML Susp suspension    MYLANTA/MAALOX     Take 15 mLs by mouth every 4 hours as needed for indigestion    Absence of menstruation, ASCUS with positive high risk HPV cervical       BENADRYL PO       Absence of  menstruation, ASCUS with positive high risk HPV cervical       BUSPAR PO      Take 15 mg by mouth    Absence of menstruation, ASCUS with positive high risk HPV cervical       DEPAKOTE PO      Take 500 mg by mouth    Absence of menstruation, ASCUS with positive high risk HPV cervical       diclofenac 50 MG EC tablet    VOLTAREN    30 tablet    Take 1 tablet (50 mg) by mouth 2 times daily as needed for moderate pain or other (Migraine)    Other migraine without status migrainosus, not intractable       gabapentin 300 MG capsule    NEURONTIN    90 capsule    Take 1 tablet (300 mg) every night for 1-3 days, then 1 tablet twice daily for 1-3 days, then 1 tablet three times daily    Chronic pain syndrome       IBUPROFEN PO       Absence of menstruation, ASCUS with positive high risk HPV cervical       MELATONIN PO      10 mg q hs    Absence of menstruation, ASCUS with positive high risk HPV cervical       PRILOSEC PO      Take 20 mg by mouth    Absence of menstruation, ASCUS with positive high risk HPV cervical       ROBAXIN PO      Take 500 mg by mouth 3 times daily    Absence of menstruation, ASCUS with positive high risk HPV cervical       SUMAtriptan 25 MG tablet    IMITREX    18 tablet    Take 1-2 tablets (25-50 mg) by mouth at onset of headache for migraine May repeat in 2 hours. Max 8 tablets/24 hours.    Other migraine without status migrainosus, not intractable       traZODone 100 MG tablet    DESYREL    90 tablet    Take 1-2 tablets (100-200 mg) by mouth At Bedtime    Insomnia due to other mental disorder       TYLENOL PO       Absence of menstruation, ASCUS with positive high risk HPV cervical       VISTARIL PO      Take 25 mg by mouth 1-2 prn    Absence of menstruation, ASCUS with positive high risk HPV cervical       vitamin D 2000 UNITS tablet      Take 1 tablet by mouth daily        ZOFRAN PO      Take by mouth every 6 hours as needed for nausea or vomiting    Absence of menstruation, ASCUS with positive  high risk HPV cervical       ZOLOFT PO      Take 25 mg by mouth daily    Absence of menstruation, ASCUS with positive high risk HPV cervical

## 2017-08-03 NOTE — PROGRESS NOTES
SUBJECTIVE:                                                    Karina Jenkins is a 32 year old female who presents to clinic today for the following health issues:    Patient is requesting refills of zofran and trazodone     Migraine Follow-Up- hx of migraines since TBI as child. Occurring more freq. Just got out of treatment per pt, pt did not disclose why she was in treatment, other than her CPS/  wanted her to go.  Pt states only percocet has helped her migraines in the past.     Headaches symptoms:  Worsened     Frequency: daily     Duration of headaches: 12 hours    Able to do normal daily activities/work with migraines: Yes    Rescue/Relief medication:ibuprofen (Advil, Motrin) and Tylenol              Effectiveness: minor relief    Preventative medication: None    Neurologic complications: No new stroke-like symptoms, loss of vision or speech, numbness or weakness    In the past 4 weeks, how often have you gone to Urgent Care or the emergency room because of your headaches?  0    Medication Followup of trazadone    Taking Medication as prescribed: yes    Side Effects:  None    Medication Helping Symptoms:  Yes    Patient states was given medication to help her sleep while in treatment and she would like a refill       Medication Followup of zofran    Taking Medication as prescribed: yes    Side Effects:  None    Medication Helping Symptoms:  Yes    Patient states was given medication to help her nausea while in treatment and she would like a refill    Problem list and histories reviewed & adjusted, as indicated.  Additional history: as documented    Patient Active Problem List   Diagnosis     Heroin addiction (H)     Tobacco use     Multiple substance abuse     Dysplasia of cervix, low grade (EWELINA 1)     Past Surgical History:   Procedure Laterality Date     NO HISTORY OF SURGERY         Social History   Substance Use Topics     Smoking status: Current Every Day Smoker     Packs/day:  0.50     Smokeless tobacco: Never Used     Alcohol use No     Family History   Problem Relation Age of Onset     Breast Cancer No family hx of      Colon Cancer No family hx of          Current Outpatient Prescriptions   Medication Sig Dispense Refill     Cholecalciferol (VITAMIN D) 2000 UNITS tablet Take 1 tablet by mouth daily       albuterol (PROAIR HFA/PROVENTIL HFA/VENTOLIN HFA) 108 (90 BASE) MCG/ACT Inhaler Inhale 2 puffs into the lungs every 6 hours       SUMAtriptan (IMITREX) 25 MG tablet Take 1-2 tablets (25-50 mg) by mouth at onset of headache for migraine May repeat in 2 hours. Max 8 tablets/24 hours. 18 tablet 1     traZODone (DESYREL) 100 MG tablet Take 1-2 tablets (100-200 mg) by mouth At Bedtime 90 tablet 0     diclofenac (VOLTAREN) 50 MG EC tablet Take 1 tablet (50 mg) by mouth 2 times daily as needed for moderate pain or other (Migraine) 30 tablet 1     ondansetron (ZOFRAN) 4 MG tablet Take 1 tablet (4 mg) by mouth every 12 hours as needed for nausea 18 tablet 0     Sertraline HCl (ZOLOFT PO) Take 25 mg by mouth daily       Omeprazole (PRILOSEC PO) Take 20 mg by mouth       BusPIRone HCl (BUSPAR PO) Take 15 mg by mouth       Divalproex Sodium (DEPAKOTE PO) Take 500 mg by mouth       HydrOXYzine Pamoate (VISTARIL PO) Take 25 mg by mouth 1-2 prn       Ondansetron HCl (ZOFRAN PO) Take by mouth every 6 hours as needed for nausea or vomiting       MELATONIN PO 10 mg q hs       DiphenhydrAMINE HCl (BENADRYL PO)        IBUPROFEN PO        Acetaminophen (TYLENOL PO)        alum & mag hydroxide-simethicone (MYLANTA/MAALOX) 200-200-20 MG/5ML SUSP suspension Take 15 mLs by mouth every 4 hours as needed for indigestion       Methocarbamol (ROBAXIN PO) Take 500 mg by mouth 3 times daily       gabapentin (NEURONTIN) 300 MG capsule Take 1 tablet (300 mg) every night for 1-3 days, then 1 tablet twice daily for 1-3 days, then 1 tablet three times daily 90 capsule 3     [DISCONTINUED] traZODone (DESYREL) 100 MG  tablet Take 100 mg by mouth At Bedtime       Allergies   Allergen Reactions     Vicodin [Hydrocodone-Acetaminophen] Itching     BP Readings from Last 3 Encounters:   08/03/17 131/87   06/12/17 127/88   04/28/17 119/78    Wt Readings from Last 3 Encounters:   08/03/17 135 lb 12.8 oz (61.6 kg)   06/12/17 134 lb (60.8 kg)   04/28/17 127 lb 6.4 oz (57.8 kg)            Labs reviewed in EPIC    Reviewed and updated as needed this visit by clinical staff  Tobacco  Allergies  Meds  Med Hx  Surg Hx  Fam Hx  Soc Hx      Reviewed and updated as needed this visit by Provider       ROS:  Constitutional, HEENT, cardiovascular, pulmonary, GI, , musculoskeletal, neuro, skin, endocrine and psych systems are negative, except as otherwise noted.      OBJECTIVE:   /87  Pulse 105  Temp 98.5  F (36.9  C) (Oral)  Wt 135 lb 12.8 oz (61.6 kg)  SpO2 99%  BMI 23.68 kg/m2  Body mass index is 23.68 kg/(m^2).  GENERAL: healthy, alert and no distress  EYES: Eyes grossly normal to inspection, PERRL and conjunctivae and sclerae normal  HENT: ear canals and TM's normal, nose and mouth without ulcers or lesions  NECK: no adenopathy, no asymmetry, masses, or scars and thyroid normal to palpation  RESP: lungs clear to auscultation - no rales, rhonchi or wheezes  CV: regular rate and rhythm, normal S1 S2, no S3 or S4, no murmur, click or rub, no peripheral edema and peripheral pulses strong  NEURO: Normal strength and tone, mentation intact and speech normal  PSYCH: mentation appears normal, affect normal/bright    Diagnostic Test Results:  See orders    ASSESSMENT/PLAN:     Migraine: much worse   Plan:  No changes in the patient's current treatment plan  See orders      ICD-10-CM    1. Other migraine without status migrainosus, not intractable G43.809 SUMAtriptan (IMITREX) 25 MG tablet     diclofenac (VOLTAREN) 50 MG EC tablet    Due to TBI as child, daily headaches   2. Insomnia due to other mental disorder F51.05 traZODone  (DESYREL) 100 MG tablet    F99    3. Nausea R11.0 ondansetron (ZOFRAN) 4 MG tablet       See Patient Instructions    Nicole Jose, KYRIE  [facility]

## 2017-08-03 NOTE — PATIENT INSTRUCTIONS
"  Migraine Headache: Stages and Treatment    A migraine headache tends to progress in stages. Learning these stages can help you better understand what is happening. Then you can learn ways to reduce pain and relieve other symptoms. Methods for relieving your symptoms include self-care and medicines.  Migraine stages  Migraines tend to progress through 4 stages. Many people don't have all stages, and stages may differ with each headache:    Prodrome. A few hours to a day or so before the headache, you may feel tired, (yawning many times), uneasy, or dee. You may also feel bloated or crave certain foods.    Aura. Up to an hour before the headache starts, some migraine sufferers experience aura--flashing lights, blind spots, other vision problems, confusion, difficulty speaking, or other neurologic symptoms.    Headache. Moderate to severe pain affects one side of the head and then can spread to both sides, often along with nausea. You may be highly sensitive to light, sound, and odors. Vomiting or diarrhea may also happen. This stage lasts 4 to 72 hours.    Postdrome. After your headache ends, you may feel tired, achy, and \"washed out.\" This may last for a day or so.  Self-care during a migraine  Here is what you can do:    Use a cold compress. Wrap a thin cloth around a cold pack, a cold can of soda, or a bag of frozen vegetables. Apply this to your temple or other pain site.    Drink fluids. If nausea makes it hard to drink, try sucking on ice.    Rest. If possible, lie down. Try not to bend over, as this may increase your pain. Sometimes laying in a dark quiet room can help the migraine from being aggravated.      Try caffeine. Some people find that drinking fluids with caffeine, such as coffee or tea, helps to lessen migraine pain.  Using medicines  Work with your healthcare provider to find the right medicines for you. Medicines for migraine may relieve pain (analgesics), relieve nausea, or attack the " migraine's root causes (migraine-specific medicines).  Rebound headache  Taking analgesics each day, or even several times a week, may lead to more frequent and severe headaches. These are called rebound headaches. If you think you're having rebound headaches, tell your healthcare provider. He or she can help you safely decrease your medicine. Rebound caffeine withdrawal headaches can also happen.    Date Last Reviewed: 10/9/2015    1557-7532 [employer]. [address] [place] [address], [place] [address]. All rights reserved. This information is not intended as a substitute for professional medical care. Always follow your healthcare professional's instructions.

## 2017-08-04 ASSESSMENT — PATIENT HEALTH QUESTIONNAIRE - PHQ9: SUM OF ALL RESPONSES TO PHQ QUESTIONS 1-9: 18

## 2017-08-04 ASSESSMENT — ANXIETY QUESTIONNAIRES: GAD7 TOTAL SCORE: 15

## 2017-08-07 PROBLEM — G43.809 OTHER MIGRAINE WITHOUT STATUS MIGRAINOSUS, NOT INTRACTABLE: Status: ACTIVE | Noted: 2017-08-07

## 2017-08-17 ENCOUNTER — OFFICE VISIT (OUTPATIENT)
Dept: FAMILY MEDICINE | Facility: CLINIC | Age: 32
End: 2017-08-17
Payer: MEDICAID

## 2017-08-17 VITALS
DIASTOLIC BLOOD PRESSURE: 85 MMHG | TEMPERATURE: 97.9 F | HEART RATE: 102 BPM | RESPIRATION RATE: 16 BRPM | BODY MASS INDEX: 23.54 KG/M2 | SYSTOLIC BLOOD PRESSURE: 120 MMHG | WEIGHT: 135 LBS

## 2017-08-17 DIAGNOSIS — R11.0 NAUSEA: ICD-10-CM

## 2017-08-17 DIAGNOSIS — Z11.3 SCREENING EXAMINATION FOR VENEREAL DISEASE: ICD-10-CM

## 2017-08-17 DIAGNOSIS — N89.8 VAGINAL DISCHARGE: ICD-10-CM

## 2017-08-17 DIAGNOSIS — Z32.00 PREGNANCY EXAMINATION OR TEST, PREGNANCY UNCONFIRMED: ICD-10-CM

## 2017-08-17 DIAGNOSIS — G43.711 INTRACTABLE CHRONIC MIGRAINE WITHOUT AURA AND WITH STATUS MIGRAINOSUS: Primary | ICD-10-CM

## 2017-08-17 DIAGNOSIS — Z13.220 SCREENING FOR LIPOID DISORDERS: ICD-10-CM

## 2017-08-17 DIAGNOSIS — Z13.1 SCREENING FOR DIABETES MELLITUS: ICD-10-CM

## 2017-08-17 LAB
BETA HCG QUAL IFA URINE: NEGATIVE
CHOLEST SERPL-MCNC: 215 MG/DL
GLUCOSE BLD-MCNC: 86 MG/DL (ref 70–99)
HDLC SERPL-MCNC: 65 MG/DL
LDLC SERPL CALC-MCNC: 88 MG/DL
NONHDLC SERPL-MCNC: 150 MG/DL
SPECIMEN SOURCE: NORMAL
TRIGL SERPL-MCNC: 308 MG/DL
WET PREP SPEC: NORMAL

## 2017-08-17 PROCEDURE — 80061 LIPID PANEL: CPT | Performed by: NURSE PRACTITIONER

## 2017-08-17 PROCEDURE — 87529 HSV DNA AMP PROBE: CPT | Performed by: NURSE PRACTITIONER

## 2017-08-17 PROCEDURE — 82947 ASSAY GLUCOSE BLOOD QUANT: CPT | Performed by: NURSE PRACTITIONER

## 2017-08-17 PROCEDURE — 87389 HIV-1 AG W/HIV-1&-2 AB AG IA: CPT | Performed by: NURSE PRACTITIONER

## 2017-08-17 PROCEDURE — 86695 HERPES SIMPLEX TYPE 1 TEST: CPT | Performed by: NURSE PRACTITIONER

## 2017-08-17 PROCEDURE — 36415 COLL VENOUS BLD VENIPUNCTURE: CPT | Performed by: NURSE PRACTITIONER

## 2017-08-17 PROCEDURE — 87210 SMEAR WET MOUNT SALINE/INK: CPT | Performed by: NURSE PRACTITIONER

## 2017-08-17 PROCEDURE — 86780 TREPONEMA PALLIDUM: CPT | Performed by: NURSE PRACTITIONER

## 2017-08-17 PROCEDURE — 99214 OFFICE O/P EST MOD 30 MIN: CPT | Performed by: NURSE PRACTITIONER

## 2017-08-17 PROCEDURE — 87591 N.GONORRHOEAE DNA AMP PROB: CPT | Performed by: NURSE PRACTITIONER

## 2017-08-17 PROCEDURE — 86696 HERPES SIMPLEX TYPE 2 TEST: CPT | Performed by: NURSE PRACTITIONER

## 2017-08-17 PROCEDURE — 87491 CHLMYD TRACH DNA AMP PROBE: CPT | Performed by: NURSE PRACTITIONER

## 2017-08-17 PROCEDURE — 84703 CHORIONIC GONADOTROPIN ASSAY: CPT | Performed by: NURSE PRACTITIONER

## 2017-08-17 RX ORDER — ONDANSETRON 4 MG/1
4 TABLET, FILM COATED ORAL EVERY 12 HOURS PRN
Qty: 18 TABLET | Refills: 0 | Status: SHIPPED | OUTPATIENT
Start: 2017-08-17 | End: 2018-01-02

## 2017-08-17 ASSESSMENT — ANXIETY QUESTIONNAIRES
IF YOU CHECKED OFF ANY PROBLEMS ON THIS QUESTIONNAIRE, HOW DIFFICULT HAVE THESE PROBLEMS MADE IT FOR YOU TO DO YOUR WORK, TAKE CARE OF THINGS AT HOME, OR GET ALONG WITH OTHER PEOPLE: NOT DIFFICULT AT ALL
6. BECOMING EASILY ANNOYED OR IRRITABLE: MORE THAN HALF THE DAYS
5. BEING SO RESTLESS THAT IT IS HARD TO SIT STILL: SEVERAL DAYS
2. NOT BEING ABLE TO STOP OR CONTROL WORRYING: NOT AT ALL
3. WORRYING TOO MUCH ABOUT DIFFERENT THINGS: NOT AT ALL
7. FEELING AFRAID AS IF SOMETHING AWFUL MIGHT HAPPEN: NOT AT ALL
1. FEELING NERVOUS, ANXIOUS, OR ON EDGE: NOT AT ALL
GAD7 TOTAL SCORE: 3

## 2017-08-17 ASSESSMENT — PATIENT HEALTH QUESTIONNAIRE - PHQ9
SUM OF ALL RESPONSES TO PHQ QUESTIONS 1-9: 8
5. POOR APPETITE OR OVEREATING: NOT AT ALL

## 2017-08-17 NOTE — MR AVS SNAPSHOT
After Visit Summary   8/17/2017    Karina Jenkins    MRN: [id number]           Patient Information     Date Of Birth          1985        Visit Information        Provider Department      8/17/2017 2:00 PM Nicole Jose NP [facility]        Today's Diagnoses     Screening examination for venereal disease    -  1    Nausea        Pregnancy examination or test, pregnancy unconfirmed        Vaginal discharge        Screening for lipoid disorders        Screening for diabetes mellitus        Intractable chronic migraine without aura and with status migrainosus          Care Instructions    Indomethacin is an abortive treatment for migraines. Take as directed, do not take multiple NSAIDS together (ibuporfen, diclofenac, indomethacin) as this can result in GI bleeding. Follow up as needed for ongoing symptoms. I will let you know your lab results and we can go from there for treatment.      Preventing Migraine Headaches: Medicines and Lifestyle Changes     Going to bed and getting up at the same time each day, including weekends, may help prevent migraines.     A migraine is a type of severe headache. Having a migraine can be very painful. But there are steps you can take to help prevent migraines.  Medicines to help prevent migraines    Your healthcare provider may prescribe certain medicines to help prevent migraines. These medicines may need to be taken daily. Or they may only need to be taken at times when you re likely to have a migraine.    Common medicines used to help prevent migraines include:    Triptans (serotonin receptor agonists)    Nonsteroidal anti-inflammatory drugs (available over-the-counter)    Beta-blockers    Anticonvulsants    Tricyclic antidepressants    Calcium channel blockers    Certain vitamins, minerals, and plant extracts    Botulinum toxin injection (Botox) for certain chronic migraines     CGRP (calcitonin gene-related peptide) agnonists are being  reviewed by the Food and Drug Administration (FDA)  Lifestyle changes for long-term prevention  Here are some suggestions:    Exercise. Regular exercise can help prevent migraines and improve your health. (If exercise triggers your migraines, talk to your healthcare provider.)    Keep regular habits. Don t skip or delay meals. Drink plenty of water. And go to bed and get up at about the same time each day. This includes weekends.    Try alternative treatments. These are treatments that do not involve the use of medicines or surgery. They may help relieve symptoms and prevent migraines. Some treatment options include biofeedback and acupuncture. Ask your healthcare provider to tell you more about these treatments if you have questions.    Limit caffeine. You may find that caffeine helps relieve pain during an attack. But too much caffeine can also trigger migraines. So, limit the amount of caffeine you consume.  Date Last Reviewed: 10/11/2015    0915-6112 [employer]. [address] [place] [address]. All rights reserved. This information is not intended as a substitute for professional medical care. Always follow your healthcare professional's instructions.        Migraine Headache: Stages and Treatment    A migraine headache tends to progress in stages. Learning these stages can help you better understand what is happening. Then you can learn ways to reduce pain and relieve other symptoms. Methods for relieving your symptoms include self-care and medicines.  Migraine stages  Migraines tend to progress through 4 stages. Many people don't have all stages, and stages may differ with each headache:    Prodrome. A few hours to a day or so before the headache, you may feel tired, (yawning many times), uneasy, or dee. You may also feel bloated or crave certain foods.    Aura. Up to an hour before the headache starts, some migraine sufferers experience aura--flashing lights, blind spots, other  "vision problems, confusion, difficulty speaking, or other neurologic symptoms.    Headache. Moderate to severe pain affects one side of the head and then can spread to both sides, often along with nausea. You may be highly sensitive to light, sound, and odors. Vomiting or diarrhea may also happen. This stage lasts 4 to 72 hours.    Postdrome. After your headache ends, you may feel tired, achy, and \"washed out.\" This may last for a day or so.  Self-care during a migraine  Here is what you can do:    Use a cold compress. Wrap a thin cloth around a cold pack, a cold can of soda, or a bag of frozen vegetables. Apply this to your temple or other pain site.    Drink fluids. If nausea makes it hard to drink, try sucking on ice.    Rest. If possible, lie down. Try not to bend over, as this may increase your pain. Sometimes laying in a dark quiet room can help the migraine from being aggravated.      Try caffeine. Some people find that drinking fluids with caffeine, such as coffee or tea, helps to lessen migraine pain.  Using medicines  Work with your healthcare provider to find the right medicines for you. Medicines for migraine may relieve pain (analgesics), relieve nausea, or attack the migraine's root causes (migraine-specific medicines).  Rebound headache  Taking analgesics each day, or even several times a week, may lead to more frequent and severe headaches. These are called rebound headaches. If you think you're having rebound headaches, tell your healthcare provider. He or she can help you safely decrease your medicine. Rebound caffeine withdrawal headaches can also happen.    Date Last Reviewed: 10/9/2015    3422-2862 [employer]. [address] [place] [address], [place] [address]. All rights reserved. This information is not intended as a substitute for professional medical care. Always follow your healthcare professional's instructions.                Follow-ups after your visit        Follow-up notes from " your care team     Return if symptoms worsen or fail to improve.      Who to contact     Normal or non-critical lab and imaging results will be communicated to you by MyChart, letter or phone within 4 business days after the clinic has received the results. If you do not hear from us within 7 days, please contact the clinic through MyChart or phone. If you have a critical or abnormal lab result, we will notify you by phone as soon as possible.  Submit refill requests through [employer] or call your pharmacy and they will forward the refill request to us. Please allow 3 business days for your refill to be completed.          If you need to speak with a  for additional information , please call: [phone]             Additional Information About Your Visit        Care EveryWhere ID     This is your Care EveryWhere ID. This could be used by other organizations to access your [facility] medical records  [id number]        Your Vitals Were     Pulse Temperature Respirations BMI (Body Mass Index)          102 97.9  F (36.6  C) (Oral) 16 23.54 kg/m2         Blood Pressure from Last 3 Encounters:   08/17/17 120/85   08/03/17 131/87   06/12/17 127/88    Weight from Last 3 Encounters:   08/17/17 135 lb (61.2 kg)   08/03/17 135 lb 12.8 oz (61.6 kg)   06/12/17 134 lb (60.8 kg)              We Performed the Following     Anti Treponema     Beta HCG qual IFA urine     CHLAMYDIA TRACHOMATIS PCR     Glucose whole blood     HIV Antigen Antibody Combo     HSV 1 and 2 DNA by PCR     Lipid panel reflex to direct LDL     NEISSERIA GONORRHOEA PCR     Wet prep          Today's Medication Changes          These changes are accurate as of: 8/17/17  3:05 PM.  If you have any questions, ask your nurse or doctor.               Start taking these medicines.        Dose/Directions    Indomethacin 40 MG Caps   Used for:  Intractable chronic migraine without aura and with status migrainosus   Started by:  Nicole Jose NP         Dose:  1 capful   Take 1 capful by mouth 2 times daily as needed take with food. Do not take with other NSAIDS (ibuprofen, diclofenac, etc), this can result in GI bleeding.   Quantity:  10 capsule   Refills:  0            Where to get your medicines      These medications were sent to [id number] - [place] [address] [address]  AT [facility] & [address] , [place] [facility] [address]     Phone:  [phone]     ondansetron 4 MG tablet         Some of these will need a paper prescription and others can be bought over the counter.  Ask your nurse if you have questions.     Bring a paper prescription for each of these medications     Indomethacin 40 MG Caps                Primary Care Provider Office Phone # Fax #    Nicole Jose [phone] [address] [facility]  [place] [address]        Equal Access to Services     [facility] Center[facility]A : Hadii aad ku hadasho Soomaali, waaxda luqadaha, qaybta kaalmada adeegyada, waxay idiin hayaan adetomasa kharapatrick olvera . So [facility] [phone].    ATENCIÓN: Si habla español, tiene a cortez disposición servicios gratuitos de asistencia lingüística. Miguel al [phone].    We comply with applicable federal civil rights laws and [place] laws. We do not discriminate on the basis of race, color, national origin, age, disability sex, sexual orientation or gender identity.            Thank you!     Thank you for choosing [facility]  for your care. Our goal is always to provide you with excellent care. Hearing back from our patients is one way we can continue to improve our services. Please take a few minutes to complete the written survey that you may receive in the mail after your visit with us. Thank you!             Your Updated Medication List - Protect others around you: Learn how to safely use, store and throw away your medicines at www.disposemymeds.org.          This list is accurate as of: 8/17/17  3:05 PM.  Always use  your most recent med list.                   Brand Name Dispense Instructions for use Diagnosis    albuterol 108 (90 BASE) MCG/ACT Inhaler    PROAIR HFA/PROVENTIL HFA/VENTOLIN HFA     Inhale 2 puffs into the lungs every 6 hours        alum & mag hydroxide-simethicone 200-200-20 MG/5ML Susp suspension    MYLANTA/MAALOX     Take 15 mLs by mouth every 4 hours as needed for indigestion    Absence of menstruation, ASCUS with positive high risk HPV cervical       BENADRYL PO       Absence of menstruation, ASCUS with positive high risk HPV cervical       BUSPAR PO      Take 15 mg by mouth    Absence of menstruation, ASCUS with positive high risk HPV cervical       DEPAKOTE PO      Take 500 mg by mouth    Absence of menstruation, ASCUS with positive high risk HPV cervical       diclofenac 50 MG EC tablet    VOLTAREN    30 tablet    Take 1 tablet (50 mg) by mouth 2 times daily as needed for moderate pain or other (Migraine)    Other migraine without status migrainosus, not intractable       gabapentin 300 MG capsule    NEURONTIN    90 capsule    Take 1 tablet (300 mg) every night for 1-3 days, then 1 tablet twice daily for 1-3 days, then 1 tablet three times daily    Chronic pain syndrome       IBUPROFEN PO       Absence of menstruation, ASCUS with positive high risk HPV cervical       Indomethacin 40 MG Caps     10 capsule    Take 1 capful by mouth 2 times daily as needed take with food. Do not take with other NSAIDS (ibuprofen, diclofenac, etc), this can result in GI bleeding.    Intractable chronic migraine without aura and with status migrainosus       MELATONIN PO      10 mg q hs    Absence of menstruation, ASCUS with positive high risk HPV cervical       PRILOSEC PO      Take 20 mg by mouth    Absence of menstruation, ASCUS with positive high risk HPV cervical       ROBAXIN PO      Take 500 mg by mouth 3 times daily    Absence of menstruation, ASCUS with positive high risk HPV cervical       SUMAtriptan 25 MG tablet     IMITREX    18 tablet    Take 1-2 tablets (25-50 mg) by mouth at onset of headache for migraine May repeat in 2 hours. Max 8 tablets/24 hours.    Other migraine without status migrainosus, not intractable       traZODone 100 MG tablet    DESYREL    90 tablet    Take 1-2 tablets (100-200 mg) by mouth At Bedtime    Insomnia due to other mental disorder       TYLENOL PO       Absence of menstruation, ASCUS with positive high risk HPV cervical       VISTARIL PO      Take 25 mg by mouth 1-2 prn    Absence of menstruation, ASCUS with positive high risk HPV cervical       vitamin D 2000 UNITS tablet      Take 1 tablet by mouth daily        * ZOFRAN PO      Take by mouth every 6 hours as needed for nausea or vomiting    Absence of menstruation, ASCUS with positive high risk HPV cervical       * ondansetron 4 MG tablet    ZOFRAN    18 tablet    Take 1 tablet (4 mg) by mouth every 12 hours as needed for nausea    Nausea       ZOLOFT PO      Take 25 mg by mouth daily    Absence of menstruation, ASCUS with positive high risk HPV cervical       * Notice:  This list has 2 medication(s) that are the same as other medications prescribed for you. Read the directions carefully, and ask your doctor or other care provider to review them with you.

## 2017-08-17 NOTE — LETTER
Karina Rose [place]  [address] [place] [address]        August 22, 2017          Dear MsClaudyGregory,    We are writing to inform you of your test results.    Nicole is not in the office today and I am reviewing her messages. My name is Ambika and I am a provider here at [facility].   Your STD screen is completely negative.     Resulted Orders   NEISSERIA GONORRHOEA PCR   Result Value Ref Range    Specimen Descrip Urine     N Gonorrhea PCR Negative NEG^Negative      Comment:      Negative for N. gonorrhoeae rRNA by transcription mediated amplification.  A negative result by transcription mediated amplification does not preclude   the presence of N. gonorrhoeae infection because results are dependent on   proper and adequate collection, absence of inhibitors, and sufficient rRNA to   be detected.     CHLAMYDIA TRACHOMATIS PCR   Result Value Ref Range    Specimen Description Urine     Chlamydia Trachomatis PCR Negative NEG^Negative      Comment:      Negative for C. trachomatis rRNA by transcription mediated amplification.  A negative result by transcription mediated amplification does not preclude   the presence of C. trachomatis infection because results are dependent on   proper and adequate collection, absence of inhibitors, and sufficient rRNA to   be detected.     HIV Antigen Antibody Combo   Result Value Ref Range    HIV Antigen Antibody Combo Nonreactive NR^Nonreactive          Comment:      HIV-1 p24 Ag & HIV-1/HIV-2 Ab Not Detected   Anti Treponema   Result Value Ref Range    Treponema pallidum Antibody Negative NEG^Negative   HSV 1 and 2 DNA by PCR   Result Value Ref Range    HSV Specimen Type Blood     HSV Type 1 PCR Canceled, Test credited (A) NEG^Negative      Comment:      Incorrectly ordered by PCU/Clinic    HSV Type 2 PCR Canceled, Test credited (A) NEG^Negative      Comment:      Incorrectly ordered by PCU/Clinic   Herpes Simplex Virus 1 and 2 IgG   Result Value Ref Range    Herpes Simplex  Virus Type 1 IgG <0.2 0.0 - 0.8 AI      Comment:      No HSV-1 IgG antibodies detected.  Antibody index (AI) values reflect qualitative changes in antibody   concentration that cannot be directly associated with clinical condition or   disease state.      Herpes Simplex Virus Type 2 IgG <0.2 0.0 - 0.8 AI      Comment:      No HSV-2 IgG antibodies detected.  Antibody index (AI) values reflect qualitative changes in antibody   concentration that cannot be directly associated with clinical condition or   disease state.         If you have any questions or concerns, please call the clinic at [phone].       Sincerely,      Ambika Tapia PA-C/chuck

## 2017-08-17 NOTE — PATIENT INSTRUCTIONS
Indomethacin is an abortive treatment for migraines. Take as directed, do not take multiple NSAIDS together (ibuporfen, diclofenac, indomethacin) as this can result in GI bleeding. Follow up as needed for ongoing symptoms. I will let you know your lab results and we can go from there for treatment.      Preventing Migraine Headaches: Medicines and Lifestyle Changes     Going to bed and getting up at the same time each day, including weekends, may help prevent migraines.     A migraine is a type of severe headache. Having a migraine can be very painful. But there are steps you can take to help prevent migraines.  Medicines to help prevent migraines    Your healthcare provider may prescribe certain medicines to help prevent migraines. These medicines may need to be taken daily. Or they may only need to be taken at times when you re likely to have a migraine.    Common medicines used to help prevent migraines include:    Triptans (serotonin receptor agonists)    Nonsteroidal anti-inflammatory drugs (available over-the-counter)    Beta-blockers    Anticonvulsants    Tricyclic antidepressants    Calcium channel blockers    Certain vitamins, minerals, and plant extracts    Botulinum toxin injection (Botox) for certain chronic migraines     CGRP (calcitonin gene-related peptide) agnonists are being reviewed by the Food and Drug Administration (FDA)  Lifestyle changes for long-term prevention  Here are some suggestions:    Exercise. Regular exercise can help prevent migraines and improve your health. (If exercise triggers your migraines, talk to your healthcare provider.)    Keep regular habits. Don t skip or delay meals. Drink plenty of water. And go to bed and get up at about the same time each day. This includes weekends.    Try alternative treatments. These are treatments that do not involve the use of medicines or surgery. They may help relieve symptoms and prevent migraines. Some treatment options include biofeedback  "and acupuncture. Ask your healthcare provider to tell you more about these treatments if you have questions.    Limit caffeine. You may find that caffeine helps relieve pain during an attack. But too much caffeine can also trigger migraines. So, limit the amount of caffeine you consume.  Date Last Reviewed: 10/11/2015    6177-2124 [employer]. [address] [place] [address], [place] [address]. All rights reserved. This information is not intended as a substitute for professional medical care. Always follow your healthcare professional's instructions.        Migraine Headache: Stages and Treatment    A migraine headache tends to progress in stages. Learning these stages can help you better understand what is happening. Then you can learn ways to reduce pain and relieve other symptoms. Methods for relieving your symptoms include self-care and medicines.  Migraine stages  Migraines tend to progress through 4 stages. Many people don't have all stages, and stages may differ with each headache:    Prodrome. A few hours to a day or so before the headache, you may feel tired, (yawning many times), uneasy, or dee. You may also feel bloated or crave certain foods.    Aura. Up to an hour before the headache starts, some migraine sufferers experience aura flashing lights, blind spots, other vision problems, confusion, difficulty speaking, or other neurologic symptoms.    Headache. Moderate to severe pain affects one side of the head and then can spread to both sides, often along with nausea. You may be highly sensitive to light, sound, and odors. Vomiting or diarrhea may also happen. This stage lasts 4 to 72 hours.    Postdrome. After your headache ends, you may feel tired, achy, and \"washed out.\" This may last for a day or so.  Self-care during a migraine  Here is what you can do:    Use a cold compress. Wrap a thin cloth around a cold pack, a cold can of soda, or a bag of frozen vegetables. Apply this to your temple " or other pain site.    Drink fluids. If nausea makes it hard to drink, try sucking on ice.    Rest. If possible, lie down. Try not to bend over, as this may increase your pain. Sometimes laying in a dark quiet room can help the migraine from being aggravated.      Try caffeine. Some people find that drinking fluids with caffeine, such as coffee or tea, helps to lessen migraine pain.  Using medicines  Work with your healthcare provider to find the right medicines for you. Medicines for migraine may relieve pain (analgesics), relieve nausea, or attack the migraine's root causes (migraine-specific medicines).  Rebound headache  Taking analgesics each day, or even several times a week, may lead to more frequent and severe headaches. These are called rebound headaches. If you think you're having rebound headaches, tell your healthcare provider. He or she can help you safely decrease your medicine. Rebound caffeine withdrawal headaches can also happen.    Date Last Reviewed: 10/9/2015    4888-6503 [employer]. [address] [place] [address], [place] [address]. All rights reserved. This information is not intended as a substitute for professional medical care. Always follow your healthcare professional's instructions.

## 2017-08-17 NOTE — PROGRESS NOTES
SUBJECTIVE:   Karina Jenkins is a 32 year old female who presents to clinic today for the following health issues:      Vaginal Symptoms  Onset: 4 days    Description:  Vaginal Discharge: white   Itching (Pruritis): YES  Burning sensation:  YES  Odor: no     Accompanying Signs & Symptoms:  Pain with Urination: no   Abdominal Pain: no   Fever: no     History:   Sexually active: YES  New Partner: YES  Possibility of Pregnancy:  Yes    Precipitating factors:   Recent Antibiotic Use: no     Alleviating factors:  none    Therapies Tried and outcome: nothing        Recheck on headaches  Wakes up every day with a headache  Seems to last all day  Throbbing pain  Headaches have been life long              Sleep issues  Taking trazadone 3 nightly too   sleep  Not effective        Problem list and histories reviewed & adjusted, as indicated.  Additional history: as documented    Patient Active Problem List   Diagnosis     Heroin addiction (H)     Tobacco use     Multiple substance abuse     Dysplasia of cervix, low grade (EWELINA 1)     Other migraine without status migrainosus, not intractable     Past Surgical History:   Procedure Laterality Date     NO HISTORY OF SURGERY         Social History   Substance Use Topics     Smoking status: Current Every Day Smoker     Packs/day: 0.50     Smokeless tobacco: Never Used     Alcohol use No     Family History   Problem Relation Age of Onset     Breast Cancer No family hx of      Colon Cancer No family hx of          Current Outpatient Prescriptions   Medication Sig Dispense Refill     ondansetron (ZOFRAN) 4 MG tablet Take 1 tablet (4 mg) by mouth every 12 hours as needed for nausea 18 tablet 0     Indomethacin 40 MG CAPS Take 1 capful by mouth 2 times daily as needed take with food. Do not take with other NSAIDS (ibuprofen, diclofenac, etc), this can result in GI bleeding. 10 capsule 0     Cholecalciferol (VITAMIN D) 2000 UNITS tablet Take 1 tablet by mouth daily       albuterol  (PROAIR HFA/PROVENTIL HFA/VENTOLIN HFA) 108 (90 BASE) MCG/ACT Inhaler Inhale 2 puffs into the lungs every 6 hours       SUMAtriptan (IMITREX) 25 MG tablet Take 1-2 tablets (25-50 mg) by mouth at onset of headache for migraine May repeat in 2 hours. Max 8 tablets/24 hours. 18 tablet 1     traZODone (DESYREL) 100 MG tablet Take 1-2 tablets (100-200 mg) by mouth At Bedtime 90 tablet 0     diclofenac (VOLTAREN) 50 MG EC tablet Take 1 tablet (50 mg) by mouth 2 times daily as needed for moderate pain or other (Migraine) 30 tablet 1     Sertraline HCl (ZOLOFT PO) Take 25 mg by mouth daily       Omeprazole (PRILOSEC PO) Take 20 mg by mouth       BusPIRone HCl (BUSPAR PO) Take 15 mg by mouth       Divalproex Sodium (DEPAKOTE PO) Take 500 mg by mouth       HydrOXYzine Pamoate (VISTARIL PO) Take 25 mg by mouth 1-2 prn       Ondansetron HCl (ZOFRAN PO) Take by mouth every 6 hours as needed for nausea or vomiting       MELATONIN PO 10 mg q hs       DiphenhydrAMINE HCl (BENADRYL PO)        IBUPROFEN PO        Acetaminophen (TYLENOL PO)        Methocarbamol (ROBAXIN PO) Take 500 mg by mouth 3 times daily       gabapentin (NEURONTIN) 300 MG capsule Take 1 tablet (300 mg) every night for 1-3 days, then 1 tablet twice daily for 1-3 days, then 1 tablet three times daily 90 capsule 3     alum & mag hydroxide-simethicone (MYLANTA/MAALOX) 200-200-20 MG/5ML SUSP suspension Take 15 mLs by mouth every 4 hours as needed for indigestion       Allergies   Allergen Reactions     Vicodin [Hydrocodone-Acetaminophen] Itching     BP Readings from Last 3 Encounters:   08/17/17 120/85   08/03/17 131/87   06/12/17 127/88    Wt Readings from Last 3 Encounters:   08/17/17 135 lb (61.2 kg)   08/03/17 135 lb 12.8 oz (61.6 kg)   06/12/17 134 lb (60.8 kg)              Labs reviewed in EPIC    Reviewed and updated as needed this visit by clinical staff  Tobacco  Allergies  Meds  Med Hx  Surg Hx  Fam Hx  Soc Hx      Reviewed and updated as needed this  visit by Provider         ROS:  Constitutional, HEENT, cardiovascular, pulmonary, GI, , musculoskeletal, neuro, skin, endocrine and psych systems are negative, except as otherwise noted.      OBJECTIVE:   /85  Pulse 102  Temp 97.9  F (36.6  C) (Oral)  Resp 16  Wt 135 lb (61.2 kg)  BMI 23.54 kg/m2  Body mass index is 23.54 kg/(m^2).  GENERAL: healthy, alert and no distress  EYES: Eyes grossly normal to inspection, PERRL and conjunctivae and sclerae normal POSITIVE for light sensitivity  NECK: no adenopathy, no asymmetry, masses, or scars and thyroid normal to palpation  RESP: lungs clear to auscultation - no rales, rhonchi or wheezes  CV: regular rate and rhythm, normal S1 S2, no S3 or S4, no murmur, click or rub, no peripheral edema and peripheral pulses strong   (female): POSITIVE for erythema, raw appearance of vaginal mucosa, no sores present, no discharge noted.   NEURO: Normal strength and tone, mentation intact and speech normal, cranial nerves intact  PSYCH: mentation appears normal, affect normal/bright    Diagnostic Test Results:  See orders    ASSESSMENT/PLAN:     Migraine: much worse   Plan:  Neurology Referral  Medications:  See orders        ICD-10-CM    1. Intractable chronic migraine without aura and with status migrainosus G43.711 Indomethacin 40 MG CAPS     NEUROLOGY ADULT REFERRAL   2. Nausea R11.0 ondansetron (ZOFRAN) 4 MG tablet   3. Screening examination for venereal disease Z11.3 NEISSERIA GONORRHOEA PCR     CHLAMYDIA TRACHOMATIS PCR     HIV Antigen Antibody Combo     Anti Treponema     HSV 1 and 2 DNA by PCR   4. Pregnancy examination or test, pregnancy unconfirmed Z32.00 Beta HCG qual IFA urine   5. Vaginal discharge N89.8 Wet prep   6. Screening for lipoid disorders Z13.220 Lipid panel reflex to direct LDL   7. Screening for diabetes mellitus Z13.1 Glucose whole blood       See Patient Instructions: Indomethacin is an abortive treatment for migraines. Take as directed, do not  take multiple NSAIDS together (ibuporfen, diclofenac, indomethacin) as this can result in GI bleeding. Follow up as needed for ongoing symptoms. I will let you know your lab results and we can go from there for treatment.    Nicole Jose, KYRIE  [facility]

## 2017-08-17 NOTE — NURSING NOTE
"Chief Complaint   Patient presents with     Headache     recheck      Sleep Problem     STD     possible exposure       Initial /85  Pulse 102  Temp 97.9  F (36.6  C) (Oral)  Resp 16  Wt 135 lb (61.2 kg)  BMI 23.54 kg/m2 Estimated body mass index is 23.54 kg/(m^2) as calculated from the following:    Height as of 4/28/17: 5' 3.5\" (1.613 m).    Weight as of this encounter: 135 lb (61.2 kg).  Medication Reconciliation: complete    "

## 2017-08-18 ENCOUNTER — TELEPHONE (OUTPATIENT)
Dept: FAMILY MEDICINE | Facility: CLINIC | Age: 32
End: 2017-08-18

## 2017-08-18 DIAGNOSIS — G43.809 OTHER MIGRAINE WITHOUT STATUS MIGRAINOSUS, NOT INTRACTABLE: Primary | ICD-10-CM

## 2017-08-18 LAB
C TRACH DNA SPEC QL NAA+PROBE: NEGATIVE
HIV 1+2 AB+HIV1 P24 AG SERPL QL IA: NONREACTIVE
HSV1 DNA SPEC QL NAA+PROBE: ABNORMAL
HSV1 IGG SERPL QL IA: <0.2 AI (ref 0–0.8)
HSV2 DNA SPEC QL NAA+PROBE: ABNORMAL
HSV2 IGG SERPL QL IA: <0.2 AI (ref 0–0.8)
N GONORRHOEA DNA SPEC QL NAA+PROBE: NEGATIVE
SPECIMEN SOURCE: ABNORMAL
SPECIMEN SOURCE: NORMAL
SPECIMEN SOURCE: NORMAL
T PALLIDUM IGG+IGM SER QL: NEGATIVE

## 2017-08-18 RX ORDER — INDOMETHACIN 25 MG/1
25-50 CAPSULE ORAL
Qty: 40 CAPSULE | Refills: 0 | Status: SHIPPED | OUTPATIENT
Start: 2017-08-18 | End: 2017-09-08

## 2017-08-18 ASSESSMENT — ANXIETY QUESTIONNAIRES: GAD7 TOTAL SCORE: 3

## 2017-08-18 NOTE — TELEPHONE ENCOUNTER
Mookie states patient dropped off a prescription for indomethacin 40 mg, but it only comes in 50 mg.  Please call to clarify.    Thank you.

## 2017-08-22 NOTE — PROGRESS NOTES
Please send the following letter to the patient:    Nicole Mansfield is not in the office today and I am reviewing her messages. My name is Ambika and I am a provider here at [facility].   Your STD screen is completely negative.    If you have any additional questions or concerns please feel free to contact me.   Ambika Tapia PA-C

## 2017-09-08 ENCOUNTER — OFFICE VISIT (OUTPATIENT)
Dept: FAMILY MEDICINE | Facility: CLINIC | Age: 32
End: 2017-09-08
Payer: MEDICAID

## 2017-09-08 ENCOUNTER — TELEPHONE (OUTPATIENT)
Dept: PALLIATIVE MEDICINE | Facility: CLINIC | Age: 32
End: 2017-09-08

## 2017-09-08 VITALS
HEART RATE: 95 BPM | TEMPERATURE: 99.4 F | OXYGEN SATURATION: 99 % | WEIGHT: 138 LBS | BODY MASS INDEX: 24.06 KG/M2 | DIASTOLIC BLOOD PRESSURE: 78 MMHG | SYSTOLIC BLOOD PRESSURE: 119 MMHG

## 2017-09-08 DIAGNOSIS — L08.9 LOCAL INFECTION OF SKIN AND SUBCUTANEOUS TISSUE: ICD-10-CM

## 2017-09-08 DIAGNOSIS — G43.711 INTRACTABLE CHRONIC MIGRAINE WITHOUT AURA AND WITH STATUS MIGRAINOSUS: Primary | ICD-10-CM

## 2017-09-08 DIAGNOSIS — R11.0 NAUSEA: ICD-10-CM

## 2017-09-08 PROCEDURE — 99214 OFFICE O/P EST MOD 30 MIN: CPT | Performed by: NURSE PRACTITIONER

## 2017-09-08 RX ORDER — ONDANSETRON 4 MG/1
4 TABLET, FILM COATED ORAL EVERY 12 HOURS PRN
Qty: 18 TABLET | Refills: 0 | Status: CANCELLED | OUTPATIENT
Start: 2017-09-08

## 2017-09-08 RX ORDER — DOXYCYCLINE 100 MG/1
100 CAPSULE ORAL 2 TIMES DAILY
Qty: 20 CAPSULE | Refills: 0 | Status: SHIPPED | OUTPATIENT
Start: 2017-09-08 | End: 2017-09-18

## 2017-09-08 RX ORDER — OXYCODONE AND ACETAMINOPHEN 5; 325 MG/1; MG/1
1 TABLET ORAL 2 TIMES DAILY PRN
Qty: 18 TABLET | Refills: 0 | Status: SHIPPED | OUTPATIENT
Start: 2017-09-08 | End: 2017-09-08

## 2017-09-08 RX ORDER — OXYCODONE AND ACETAMINOPHEN 5; 325 MG/1; MG/1
1 TABLET ORAL 2 TIMES DAILY PRN
Qty: 18 TABLET | Refills: 0 | Status: SHIPPED | OUTPATIENT
Start: 2017-09-08

## 2017-09-08 RX ORDER — ONDANSETRON 4 MG/1
4-8 TABLET, ORALLY DISINTEGRATING ORAL EVERY 8 HOURS PRN
Qty: 20 TABLET | Refills: 1 | Status: SHIPPED | OUTPATIENT
Start: 2017-09-08 | End: 2017-12-08

## 2017-09-08 NOTE — PATIENT INSTRUCTIONS
Schedule your MRI and schedule with Neurology and Pain management.  I need pain managements recommendations for further treatment of your chronic migraines, as percocet is not usually used for migraines.  Follow up as needed for additional health concerns.       * Migraine Headache  Migraine headaches are related to changes in blood flow to the brain. This causes throbbing or constant pain on one or both sides of the head. The pain may last from a few hours to several days. There is usually nausea, vomiting, sensitivity to light and sound, and blurred vision. A migraine attack may be triggered by emotional stress, hormone changes during the menstrual cycle, oral contraceptives, alcohol use, certain foods containing tyramine, eye strain, weather changes, missing meals, or too little or too much sleep.  Home Care For This Headache:  1) If you were given pain medicine for this headache, do not drive yourself home . Arrange for a ride, instead. When you get home, try to sleep. You should feel much better when you wake up.  2) Migraine headaches may improve with an ice pack on the forehead or at the base of the skull. Heat to the back of your neck may relieve any neck spasm.  3) Drink only clear liquids or eat a very light diet to avoid nausea/vomiting until symptoms improve.  Preventing Future Headaches:  1) Pay attention to those factors that seem to trigger your headache. Try to avoid them when you can. If you have frequent headaches, it is useful to keep a diary of what you were doing, feeling or eating in the hours before each attack. Show this to your doctor to help find the cause of your headaches.  a) If you feel that stress is a factor in your headaches, look at the sources of stress in your life. Find ways to release the build-up of those stresses by using regular exercise, relaxation methods (yoga, meditation), bio-feedback or simply taking time-out for yourself. For more information about this, consult your  doctor or go to a local bookstore and review books and tapes on this subject.  b) Tyramine is a substance present in the following foods : chocolate, yogurt, all cheeses except cottage cheese and cream cheese. smoked or pickled fish and meat (including herring, caviar, bologna, pepperoni, salami), liver, avocados, bananas, figs, raisins, and red wine. Be aware that these foods may trigger a migraine in some persons. Try taking these foods out of your diet for 1-2 months to see if this reduces headache frequency.  Treating Future Attacks:  1) At the first sign of a migraine headache, take a medicine to stop it if one has been prescribed for you. If not, take acetaminophen (Tylenol) or ibuprofen (Motrin, Advil) if you are able to take these. The sooner you take medicine, the better it will work.  2) You may also want to find a quiet, dark, comfortable place to sit or lie down. Let yourself relax or sleep.  3) An ice pack on the forehead or area of greatest pain may also help.   Follow Up  with your doctor if the headache is not better within the next 24 hours. If you have frequent headaches you should discuss a treatment plan with your primary care doctor. Ask if you can have medicine to take at home the next time you get a bad headache. Poorly controlled chronic headaches may require a referral to a neurologist (headache specialist).  Get Prompt Medical Attention  if any of the following occur:    Your head pain gets worse, or does not improve within 24 hours    Repeated vomiting (can t keep liquids down)    Sinus or ear or throat pain (not already reported)    Fever of 101  F (38.3  C) or higher, or as directed by your healthcare provider    Stiff neck    Extreme drowsiness, confusion or fainting    Weakness of an arm or leg or one side of the face    Difficulty with speech or vision    6930-0093 [employer]. [address] [place] [address], [place] [address]. All rights reserved. This information is not  "intended as a substitute for professional medical care. Always follow your healthcare professional's instructions.        Migraine Headache: Stages and Treatment    A migraine headache tends to progress in stages. Learning these stages can help you better understand what is happening. Then you can learn ways to reduce pain and relieve other symptoms. Methods for relieving your symptoms include self-care and medicines.  Migraine stages  Migraines tend to progress through 4 stages. Many people don't have all stages, and stages may differ with each headache:    Prodrome. A few hours to a day or so before the headache, you may feel tired, (yawning many times), uneasy, or dee. You may also feel bloated or crave certain foods.    Aura. Up to an hour before the headache starts, some migraine sufferers experience aura--flashing lights, blind spots, other vision problems, confusion, difficulty speaking, or other neurologic symptoms.    Headache. Moderate to severe pain affects one side of the head and then can spread to both sides, often along with nausea. You may be highly sensitive to light, sound, and odors. Vomiting or diarrhea may also happen. This stage lasts 4 to 72 hours.    Postdrome. After your headache ends, you may feel tired, achy, and \"washed out.\" This may last for a day or so.  Self-care during a migraine  Here is what you can do:    Use a cold compress. Wrap a thin cloth around a cold pack, a cold can of soda, or a bag of frozen vegetables. Apply this to your temple or other pain site.    Drink fluids. If nausea makes it hard to drink, try sucking on ice.    Rest. If possible, lie down. Try not to bend over, as this may increase your pain. Sometimes laying in a dark quiet room can help the migraine from being aggravated.      Try caffeine. Some people find that drinking fluids with caffeine, such as coffee or tea, helps to lessen migraine pain.  Using medicines  Work with your healthcare provider to find " the right medicines for you. Medicines for migraine may relieve pain (analgesics), relieve nausea, or attack the migraine's root causes (migraine-specific medicines).  Rebound headache  Taking analgesics each day, or even several times a week, may lead to more frequent and severe headaches. These are called rebound headaches. If you think you're having rebound headaches, tell your healthcare provider. He or she can help you safely decrease your medicine. Rebound caffeine withdrawal headaches can also happen.    Date Last Reviewed: 10/9/2015    8830-8197 [employer]. [address] [place] [address], [place] [address]. All rights reserved. This information is not intended as a substitute for professional medical care. Always follow your healthcare professional's instructions.

## 2017-09-08 NOTE — LETTER
September 21, 2017    Karina Estefania [place]  [address] [place] [address]    Dear Karina     Welcome to the [facility].  We are located on the 2nd floor ([address]) of the [facility], located at [address] [place], [place] [address], [place] [address].    Your appointment at the [facility] has been scheduled on October 12th at 9:00am with Ari Bailey MD.    At your first visit, you will meet your team of caregivers who will help you to develop pain management strategies that will last a lifetime. You will meet with our support staff to review your insurance information, and collect your co-payment if required by your insurance company. You will also meet with a medical pain specialist and care coordinator who will assess your pain and develop a plan of care for your successful pain rehabilitation. You should expect to spend 1-2 hours at your first visit with us. Usually, patients work with us for a period of 6-12 months, and eventually return to their primary doctor once their pain management has stabilized.      To help us make your visit go as smoothly as possible, please bring the following items with you on your visit:     Completed Pain Questionnaire enclosed in this packet.  If you do not bring the completed questionnaire, we may have to reschedule your appointment.  List of any medicines that you are currently taking or have been prescribed  Important NON-[place] medical information such as medical records or tests results (X-rays, or laboratory tests)  Your health insurance card  Financial resources to cover your co-payment or balance due at the time of service (cash, personal check, Visa, and MasterCard are acceptable methods of payment)     Due to the demand for new patient evaluations, you must notify the scheduling department 48 hours in advance if you are not able to keep this appointment. Failure to do so could affect your ability to reschedule  with our clinic. Please be aware that we will not prescribe any medications at your first visit.     Please call [phone] with any questions regarding your appointment. We look forward to meeting you and working to address your health care needs.     Sincerely,      [facility] [place]
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
## 2017-09-08 NOTE — NURSING NOTE
"Chief Complaint   Patient presents with     Headache     Rash     Sleep Problem       Initial /78  Pulse 95  Temp 99.4  F (37.4  C) (Oral)  Wt 138 lb (62.6 kg)  SpO2 99%  BMI 24.06 kg/m2 Estimated body mass index is 24.06 kg/(m^2) as calculated from the following:    Height as of 4/28/17: 5' 3.5\" (1.613 m).    Weight as of this encounter: 138 lb (62.6 kg).  Medication Reconciliation: complete     Lois Aparicio MA  "

## 2017-09-21 ENCOUNTER — RADIANT APPOINTMENT (OUTPATIENT)
Dept: MRI IMAGING | Facility: CLINIC | Age: 32
End: 2017-09-21
Attending: NURSE PRACTITIONER
Payer: MEDICAID

## 2017-09-21 DIAGNOSIS — G43.711 INTRACTABLE CHRONIC MIGRAINE WITHOUT AURA AND WITH STATUS MIGRAINOSUS: ICD-10-CM

## 2017-09-21 PROCEDURE — 70553 MRI BRAIN STEM W/O & W/DYE: CPT | Mod: TC

## 2017-09-21 PROCEDURE — A9585 GADOBUTROL INJECTION: HCPCS | Mod: JW | Performed by: NURSE PRACTITIONER

## 2017-09-21 RX ORDER — GADOBUTROL 604.72 MG/ML
7.5 INJECTION INTRAVENOUS ONCE
Status: COMPLETED | OUTPATIENT
Start: 2017-09-21 | End: 2017-09-21

## 2017-09-21 RX ADMIN — GADOBUTROL 6 ML: 604.72 INJECTION INTRAVENOUS at 14:46

## 2017-09-21 NOTE — TELEPHONE ENCOUNTER
Pain Management Center Referral      1. Confirmed address with patient? Yes  2. Confirmed phone number with patient? Yes  3. Confirmed referring provider? Yes  4. Is the PCP the same as the referring provider? Yes  5. Has the patient been to any previous pain clinics? No  (If yes, send MAGALIE with welcome letter)  6. Which insurance are we to bill for this appointment?  [employer]    7. Informed pt of cancellation (48 hour) policy? Yes    REGARDING OPIOID MEDICATIONS: We will always address appropriateness of opioid pain medications, but we generally will not automatically take on a prescribing role. When we do take on prescribing of opioids for chronic pain, it is in collaboration with the referring physician for an intermediate period of time (months), with an expectation that the primary physician or provider will assume the prescribing role if medications are effective at stable doses with demonstrated compliance. Therefore, please do not assume that your prescribing responsibilities end on the day of pain clinic consultation.  7. Informed pt of prescribing policy? Yes      8. Referring Provider: Nicole Jose

## 2017-09-21 NOTE — LETTER
September 25, 2017      Karina Mac Larimore  [address] [place] [address]        Dear ,    We are writing to inform you of your test results.    normal brain MRI.     Resulted Orders   MR Brain w/o & w Contrast    Narrative    MR BRAIN W/O & W CONTRAST  9/21/2017 2:55 PM     HISTORY:  worsening migraines, Chronic migraine without aura,  intractable, with status migrainosus    TECHNIQUE: Multiplanar, multisequence MRI of the brain without and  with 6 mL Gadavist IV contrast material.    COMPARISON: None.    FINDINGS:  The brain parenchyma, ventricles and subarachnoid spaces  appear normal for age.  There is no evidence of hemorrhage, mass,  acute infarct, or anomaly. There are no gadolinium enhancing lesions.   The facial structures appear normal.  The arteries at the base of the  brain and the dural venous sinuses appear patent.      Impression    IMPRESSION: No structural abnormalities are identified, no bleed,  mass, or infarcts are seen.     MONICA SELLERS MD       If you have any questions or concerns, please call the clinic at the number listed above.       Sincerely,  JESSICA Flores, FNP-BC

## 2017-10-26 NOTE — PROGRESS NOTES
SUBJECTIVE:   CC: Karina Jenkins is an 32 year old woman who presents for preventive health visit.     Healthy Habits:    Do you get at least three servings of calcium containing foods daily (dairy, green leafy vegetables, etc.)? yes    Amount of exercise or daily activities, outside of work: 2-3 day(s) per week    Problems taking medications regularly No    Medication side effects: No    Have you had an eye exam in the past two years? yes    Do you see a dentist twice per year? yes    Do you have sleep apnea, excessive snoring or daytime drowsiness?yes        PROBLEMS TO ADD ON...  Thin vaginal discharge with odor  Thinks she has vaginal infection  Would also like STD screen  Tx: none      Today's PHQ-2 Score:   PHQ-2 ( 1999 Pfizer) 8/17/2017 8/3/2017   Q1: Little interest or pleasure in doing things 2 3   Q2: Feeling down, depressed or hopeless 2 3   PHQ-2 Score 4 6       Abuse: Current or Past(Physical, Sexual or Emotional)- No  Do you feel safe in your environment - Yes    Social History   Substance Use Topics     Smoking status: Current Every Day Smoker     Packs/day: 0.50     Smokeless tobacco: Never Used     Alcohol use No     The patient does not drink >3 drinks per day nor >7 drinks per week.    Patient informed that anything we discuss that is not related to preventative medicine, may be billed for; patient verbalizes understanding.    Reviewed orders with patient.  Reviewed health maintenance and updated orders accordingly - Yes  Labs reviewed in EPIC    Mammogram not appropriate for this patient based on age.    Pertinent mammograms are reviewed under the imaging tab.  History of abnormal Pap smear:   YES - updated in Problem List and Health Maintenance accordingly  Last 3 Pap Results:   PAP (no units)   Date Value   04/28/2017 ASC-US (A)       Reviewed and updated as needed this visit by clinical staff  Tobacco  Allergies  Meds  Med Hx  Surg Hx  Fam Hx  Soc Hx        Reviewed and updated  "as needed this visit by Provider        Past Medical History:   Diagnosis Date     ASCUS with positive high risk HPV cervical 04/28/2017    Neg 16/18     Chronic back pain      Depression with anxiety      GERD (gastroesophageal reflux disease)      H/O colposcopy with cervical biopsy 06/12/2017    ECC - EWELINA 1, Bx - Negative     Heroin addiction (H)      Marijuana use, episodic      Migraine      Tobacco use     1/2 PPD        ROS:  Other than what is noted in the HPI and PMH a complete review of systems is otherwise negative including: Constitutional, HEENT, endocrine, cardiovascular, respiratory, GI/, musculoskeletal, neuro, and psychiatric.     OBJECTIVE:   /80  Pulse 83  Temp 98.4  F (36.9  C) (Oral)  Ht 5' 3.5\" (1.613 m)  Wt 140 lb (63.5 kg)  SpO2 100%  BMI 24.41 kg/m2  EXAM:  GENERAL: healthy, alert and no distress  EYES: Eyes grossly normal to inspection, PERRL and conjunctivae and sclerae normal  HENT: ear canals and TM's normal, nose and mouth without ulcers or lesions  NECK: no adenopathy, no asymmetry, masses, or scars and thyroid normal to palpation  RESP: lungs clear to auscultation - no rales, rhonchi or wheezes  BREAST: normal without masses, tenderness or nipple discharge and no palpable axillary masses or adenopathy  CV: regular rate and rhythm, normal S1 S2, no S3 or S4, no murmur, click or rub, no peripheral edema and peripheral pulses strong  ABDOMEN: soft, nontender, no hepatosplenomegaly, no masses and bowel sounds normal  : normal genitalia, vaginal vault, and cervix. Thin discharge present  MS: no gross musculoskeletal defects noted, no edema  SKIN: no suspicious lesions or rashes  NEURO: Normal strength and tone, mentation intact and speech normal  PSYCH: mentation appears normal, affect normal/bright    ASSESSMENT/PLAN:       ICD-10-CM    1. Encounter for routine adult health examination without abnormal findings Z00.00    2. Need for prophylactic vaccination and inoculation " "against influenza Z23 FLU VAC, SPLIT VIRUS IM > 3 YO (QUADRIVALENT) [01780]     Vaccine Administration, Initial [97008]   3. Screen for STD (sexually transmitted disease) Z11.3 Anti Treponema     Neisseria gonorrhoeae PCR     HIV Antigen Antibody Combo     Chlamydia trachomatis PCR   4. Vaginal discharge N89.8 Wet prep   5. Possible pregnancy Z32.00 Beta HCG qual IFA urine - [facility] and [place]   6. Bacterial vaginal infection N76.0 metroNIDAZOLE (FLAGYL) 500 MG tablet    B96.89        UPT neg. Wet prep positive for BV. Will treat with metronidazole. STD screen obtained.     Follow up annually.      COUNSELING:   Reviewed preventive health counseling, as reflected in patient instructions       reports that she has been smoking.  She has been smoking about 0.50 packs per day. She has never used smokeless tobacco.    Estimated body mass index is 24.41 kg/(m^2) as calculated from the following:    Height as of this encounter: 5' 3.5\" (1.613 m).    Weight as of this encounter: 140 lb (63.5 kg).       Counseling Resources:  ATP IV Guidelines  Pooled Cohorts Equation Calculator  Breast Cancer Risk Calculator  FRAX Risk Assessment  ICSI Preventive Guidelines  Dietary Guidelines for Americans, 2010  USDA's MyPlate  ASA Prophylaxis  Lung CA Screening    Ambika Tapia PA-C  [facility]        Injectable Influenza Immunization Documentation    1.  Is the person to be vaccinated sick today?   No    2. Does the person to be vaccinated have an allergy to a component   of the vaccine?   No  Egg Allergy Algorithm Link    3. Has the person to be vaccinated ever had a serious reaction   to influenza vaccine in the past?   No    4. Has the person to be vaccinated ever had Guillain-Barré syndrome?   No    Form completed by Zahra Willis CMA           "

## 2017-10-27 ENCOUNTER — OFFICE VISIT (OUTPATIENT)
Dept: FAMILY MEDICINE | Facility: CLINIC | Age: 32
End: 2017-10-27
Payer: MEDICAID

## 2017-10-27 VITALS
TEMPERATURE: 98.4 F | WEIGHT: 140 LBS | HEIGHT: 64 IN | OXYGEN SATURATION: 100 % | BODY MASS INDEX: 23.9 KG/M2 | HEART RATE: 83 BPM | DIASTOLIC BLOOD PRESSURE: 80 MMHG | SYSTOLIC BLOOD PRESSURE: 132 MMHG

## 2017-10-27 DIAGNOSIS — Z32.00 POSSIBLE PREGNANCY: ICD-10-CM

## 2017-10-27 DIAGNOSIS — Z00.00 ENCOUNTER FOR ROUTINE ADULT HEALTH EXAMINATION WITHOUT ABNORMAL FINDINGS: Primary | ICD-10-CM

## 2017-10-27 DIAGNOSIS — B96.89 BACTERIAL VAGINAL INFECTION: ICD-10-CM

## 2017-10-27 DIAGNOSIS — Z11.3 SCREEN FOR STD (SEXUALLY TRANSMITTED DISEASE): ICD-10-CM

## 2017-10-27 DIAGNOSIS — Z23 NEED FOR PROPHYLACTIC VACCINATION AND INOCULATION AGAINST INFLUENZA: ICD-10-CM

## 2017-10-27 DIAGNOSIS — N89.8 VAGINAL DISCHARGE: ICD-10-CM

## 2017-10-27 DIAGNOSIS — N76.0 BACTERIAL VAGINAL INFECTION: ICD-10-CM

## 2017-10-27 LAB
BETA HCG QUAL IFA URINE: NEGATIVE
SPECIMEN SOURCE: ABNORMAL
WET PREP SPEC: ABNORMAL

## 2017-10-27 PROCEDURE — 87591 N.GONORRHOEAE DNA AMP PROB: CPT | Performed by: PHYSICIAN ASSISTANT

## 2017-10-27 PROCEDURE — 87389 HIV-1 AG W/HIV-1&-2 AB AG IA: CPT | Performed by: PHYSICIAN ASSISTANT

## 2017-10-27 PROCEDURE — 90471 IMMUNIZATION ADMIN: CPT | Performed by: PHYSICIAN ASSISTANT

## 2017-10-27 PROCEDURE — 99395 PREV VISIT EST AGE 18-39: CPT | Mod: 25 | Performed by: PHYSICIAN ASSISTANT

## 2017-10-27 PROCEDURE — 84703 CHORIONIC GONADOTROPIN ASSAY: CPT | Performed by: PHYSICIAN ASSISTANT

## 2017-10-27 PROCEDURE — 36415 COLL VENOUS BLD VENIPUNCTURE: CPT | Performed by: PHYSICIAN ASSISTANT

## 2017-10-27 PROCEDURE — 86780 TREPONEMA PALLIDUM: CPT | Performed by: PHYSICIAN ASSISTANT

## 2017-10-27 PROCEDURE — 87210 SMEAR WET MOUNT SALINE/INK: CPT | Performed by: PHYSICIAN ASSISTANT

## 2017-10-27 PROCEDURE — 87491 CHLMYD TRACH DNA AMP PROBE: CPT | Performed by: PHYSICIAN ASSISTANT

## 2017-10-27 PROCEDURE — 99213 OFFICE O/P EST LOW 20 MIN: CPT | Mod: 25 | Performed by: PHYSICIAN ASSISTANT

## 2017-10-27 PROCEDURE — 90686 IIV4 VACC NO PRSV 0.5 ML IM: CPT | Performed by: PHYSICIAN ASSISTANT

## 2017-10-27 RX ORDER — METRONIDAZOLE 500 MG/1
500 TABLET ORAL 2 TIMES DAILY
Qty: 14 TABLET | Refills: 0 | Status: SHIPPED | OUTPATIENT
Start: 2017-10-27 | End: 2017-12-08

## 2017-10-27 NOTE — MR AVS SNAPSHOT
After Visit Summary   10/27/2017    Karina Jenkins    MRN: [id number]           Patient Information     Date Of Birth          1985        Visit Information        Provider Department      10/27/2017 3:40 PM Ambika Tapia PA-C [facility] [place]        Today's Diagnoses     Need for prophylactic vaccination and inoculation against influenza    -  1    Screen for STD (sexually transmitted disease)        Vaginal discharge        Possible pregnancy        Bacterial vaginal infection          Care Instructions      Preventive Health Recommendations  Female Ages 26 - 39  Yearly exam:   See your health care provider every year in order to    Review health changes.     Discuss preventive care.      Review your medicines if you your doctor has prescribed any.    Until age 30: Get a Pap test every three years (more often if you have had an abnormal result).    After age 30: Talk to your doctor about whether you should have a Pap test every 3 years or have a Pap test with HPV screening every 5 years.   You do not need a Pap test if your uterus was removed (hysterectomy) and you have not had cancer.  You should be tested each year for STDs (sexually transmitted diseases), if you're at risk.   Talk to your provider about how often to have your cholesterol checked.  If you are at risk for diabetes, you should have a diabetes test (fasting glucose).  Shots: Get a flu shot each year. Get a tetanus shot every 10 years.   Nutrition:     Eat at least 5 servings of fruits and vegetables each day.    Eat whole-grain bread, whole-wheat pasta and brown rice instead of white grains and rice.    Talk to your provider about Calcium and Vitamin D.     Lifestyle    Exercise at least 150 minutes a week (30 minutes a day, 5 days of the week). This will help you control your weight and prevent disease.    Limit alcohol to one drink per day.    No smoking.     Wear sunscreen to prevent skin cancer.    See your  "dentist every six months for an exam and cleaning.            Follow-ups after your visit        Your next 10 appointments already scheduled     Nov 09, 2017 11:00 AM CST   New Visit with Ari Bailey MD   [facility] [place] ([facility])    [address]  [place] [address] [phone]              Who to contact     Normal or non-critical lab and imaging results will be communicated to you by MyChart, letter or phone within 4 business days after the clinic has received the results. If you do not hear from us within 7 days, please contact the clinic through MyChart or phone. If you have a critical or abnormal lab result, we will notify you by phone as soon as possible.  Submit refill requests through [employer] or call your pharmacy and they will forward the refill request to us. Please allow 3 business days for your refill to be completed.          If you need to speak with a  for additional information , please call: [phone]             Additional Information About Your Visit        Care EveryWhere ID     This is your Care EveryWhere ID. This could be used by other organizations to access your [place] medical records  [id number]        Your Vitals Were     Pulse Temperature Height Pulse Oximetry BMI (Body Mass Index)       83 98.4  F (36.9  C) (Oral) 5' 3.5\" (1.613 m) 100% 24.41 kg/m2        Blood Pressure from Last 3 Encounters:   10/27/17 132/80   09/08/17 119/78   08/17/17 120/85    Weight from Last 3 Encounters:   10/27/17 140 lb (63.5 kg)   09/08/17 138 lb (62.6 kg)   08/17/17 135 lb (61.2 kg)              We Performed the Following     Anti Treponema     Beta HCG qual IFA urine - [facility] and [place]     Chlamydia trachomatis PCR     FLU VAC, SPLIT VIRUS IM > 3 YO (QUADRIVALENT) [79272]     HIV Antigen Antibody Combo     Neisseria gonorrhoeae PCR     Vaccine Administration, Initial [31517]     Wet prep          Today's Medication " Changes          These changes are accurate as of: 10/27/17  4:42 PM.  If you have any questions, ask your nurse or doctor.               Start taking these medicines.        Dose/Directions    metroNIDAZOLE 500 MG tablet   Commonly known as:  FLAGYL   Used for:  Bacterial vaginal infection   Started by:  Ambika Tapia PA-C        Dose:  500 mg   Take 1 tablet (500 mg) by mouth 2 times daily   Quantity:  14 tablet   Refills:  0            Where to get your medicines      These medications were sent to [facility] [address] [place], [place] [address], [place] - [address]  [address], [place] [address]     Phone:  [phone]     metroNIDAZOLE 500 MG tablet                Primary Care Provider Office Phone # Fax #    Nicole JoseJAMSHID [phone]       [address]  [place] [address]        Equal Access to Services     [facility]: Hadii aad ku hadasho Soomaali, waaxda luqadaha, qaybta kaalmada adeegyada, ervin salazar haykirby olvera . So [facility] [phone].    ATENCIÓN: Si habla español, tiene a cortez disposición servicios gratuitos de asistencia lingüística. [facility] [phone].    We comply with applicable federal civil rights laws and [place] laws. We do not discriminate on the basis of race, color, national origin, age, disability, sex, sexual orientation, or gender identity.            Thank you!     Thank you for choosing [facility]  for your care. Our goal is always to provide you with excellent care. Hearing back from our patients is one way we can continue to improve our services. Please take a few minutes to complete the written survey that you may receive in the mail after your visit with us. Thank you!             Your Updated Medication List - Protect others around you: Learn how to safely use, store and throw away your medicines at www.disposemymeds.org.          This list is accurate as of: 10/27/17  4:42 PM.  Always use your most recent med list.                    Brand Name Dispense Instructions for use Diagnosis    albuterol 108 (90 BASE) MCG/ACT Inhaler    PROAIR HFA/PROVENTIL HFA/VENTOLIN HFA     Inhale 2 puffs into the lungs every 6 hours        alum & mag hydroxide-simethicone 200-200-20 MG/5ML Susp suspension    MYLANTA/MAALOX     Take 15 mLs by mouth every 4 hours as needed for indigestion    Absence of menstruation, ASCUS with positive high risk HPV cervical       BENADRYL PO       Absence of menstruation, ASCUS with positive high risk HPV cervical       BUSPAR PO      Take 15 mg by mouth    Absence of menstruation, ASCUS with positive high risk HPV cervical       DEPAKOTE PO      Take 500 mg by mouth    Absence of menstruation, ASCUS with positive high risk HPV cervical       gabapentin 300 MG capsule    NEURONTIN    90 capsule    Take 1 tablet (300 mg) every night for 1-3 days, then 1 tablet twice daily for 1-3 days, then 1 tablet three times daily    Chronic pain syndrome       MELATONIN PO      10 mg q hs    Absence of menstruation, ASCUS with positive high risk HPV cervical       metroNIDAZOLE 500 MG tablet    FLAGYL    14 tablet    Take 1 tablet (500 mg) by mouth 2 times daily    Bacterial vaginal infection       ondansetron 4 MG ODT tab    ZOFRAN ODT    20 tablet    Take 1-2 tablets (4-8 mg) by mouth every 8 hours as needed for nausea    Nausea, Intractable chronic migraine without aura and with status migrainosus       ondansetron 4 MG tablet    ZOFRAN    18 tablet    Take 1 tablet (4 mg) by mouth every 12 hours as needed for nausea    Nausea       oxyCODONE-acetaminophen 5-325 MG per tablet    PERCOCET    18 tablet    Take 1 tablet by mouth 2 times daily as needed for moderate to severe pain maximum 2 tablet(s) per day    Intractable chronic migraine without aura and with status migrainosus       PRILOSEC PO      Take 20 mg by mouth    Absence of menstruation, ASCUS with positive high risk HPV cervical       ROBAXIN PO      Take 500 mg by mouth 3  times daily    Absence of menstruation, ASCUS with positive high risk HPV cervical       SUMAtriptan 25 MG tablet    IMITREX    18 tablet    Take 1-2 tablets (25-50 mg) by mouth at onset of headache for migraine May repeat in 2 hours. Max 8 tablets/24 hours.    Other migraine without status migrainosus, not intractable       traZODone 100 MG tablet    DESYREL    90 tablet    Take 1-2 tablets (100-200 mg) by mouth At Bedtime    Insomnia due to other mental disorder       VISTARIL PO      Take 25 mg by mouth 1-2 prn    Absence of menstruation, ASCUS with positive high risk HPV cervical       vitamin D 2000 UNITS tablet      Take 1 tablet by mouth daily        ZOLOFT PO      Take 25 mg by mouth daily    Absence of menstruation, ASCUS with positive high risk HPV cervical

## 2017-10-27 NOTE — LETTER
October 31, 2017      Karina Mac Rule  [address] [place] [address]        Dear ,    We are writing to inform you of your test results.    Your STD screen is negative.     Resulted Orders   Anti Treponema   Result Value Ref Range    Treponema pallidum Antibody Negative NEG^Negative   Neisseria gonorrhoeae PCR   Result Value Ref Range    Specimen Descrip Urine     N Gonorrhea PCR Negative NEG^Negative      Comment:      Negative for N. gonorrhoeae rRNA by transcription mediated amplification.  A negative result by transcription mediated amplification does not preclude   the presence of N. gonorrhoeae infection because results are dependent on   proper and adequate collection, absence of inhibitors, and sufficient rRNA to   be detected.     HIV Antigen Antibody Combo   Result Value Ref Range    HIV Antigen Antibody Combo Nonreactive NR^Nonreactive          Comment:      HIV-1 p24 Ag & HIV-1/HIV-2 Ab Not Detected   Chlamydia trachomatis PCR   Result Value Ref Range    Specimen Description Urine     Chlamydia Trachomatis PCR Negative NEG^Negative      Comment:      Negative for C. trachomatis rRNA by transcription mediated amplification.  A negative result by transcription mediated amplification does not preclude   the presence of C. trachomatis infection because results are dependent on   proper and adequate collection, absence of inhibitors, and sufficient rRNA to   be detected.     Wet prep   Result Value Ref Range    Specimen Description Vagina     Wet Prep No Trichomonas seen     Wet Prep Clue cells seen (A)     Wet Prep No yeast seen    Beta HCG qual IFA urine - [facility] and [place]   Result Value Ref Range    Beta HCG Qual IFA Urine Negative NEG^Negative          If you have any questions or concerns, please call the clinic at [phone].       Sincerely,        Ambika Tapia PA-C/chuck

## 2017-10-28 LAB — T PALLIDUM IGG+IGM SER QL: NEGATIVE

## 2017-10-29 LAB
C TRACH DNA SPEC QL NAA+PROBE: NEGATIVE
N GONORRHOEA DNA SPEC QL NAA+PROBE: NEGATIVE
SPECIMEN SOURCE: NORMAL
SPECIMEN SOURCE: NORMAL

## 2017-10-30 LAB — HIV 1+2 AB+HIV1 P24 AG SERPL QL IA: NONREACTIVE

## 2017-10-31 NOTE — PROGRESS NOTES
Please send the following letter to the patient:    Karina,    Your STD screen is negative.    Please call me with any questions or concerns.          Ambika Tapia PA-C

## 2017-11-09 ENCOUNTER — OFFICE VISIT (OUTPATIENT)
Dept: PALLIATIVE MEDICINE | Facility: CLINIC | Age: 32
End: 2017-11-09
Payer: MEDICAID

## 2017-11-09 VITALS
DIASTOLIC BLOOD PRESSURE: 77 MMHG | SYSTOLIC BLOOD PRESSURE: 122 MMHG | BODY MASS INDEX: 25.28 KG/M2 | HEART RATE: 97 BPM | WEIGHT: 145 LBS

## 2017-11-09 DIAGNOSIS — G44.40 MEDICATION OVERUSE HEADACHE: ICD-10-CM

## 2017-11-09 DIAGNOSIS — G43.809 OTHER MIGRAINE WITHOUT STATUS MIGRAINOSUS, NOT INTRACTABLE: Primary | ICD-10-CM

## 2017-11-09 DIAGNOSIS — M79.18 MYOFASCIAL PAIN: ICD-10-CM

## 2017-11-09 PROCEDURE — 99205 OFFICE O/P NEW HI 60 MIN: CPT | Performed by: PAIN MEDICINE

## 2017-11-09 NOTE — PROGRESS NOTES
[facility] Consultation    Date of visit: 11/9/2017    Reason for consultation:    Primary Care Provider is Nicole Jose.  Pain medications are being prescribed by pcp.    Please see the [facility] health questionnaire which the patient completed and reviewed with me in detail.    Chief Complaint:    Chief Complaint   Patient presents with     Pain     Migraine         Pain history:  Karina Jenkins is a 32 year old female who first started having problems with HAs for > 20 yrs. Reports having a TBI when she was 5 yr. The pt reports constant HAs. The HA mainly comes from the occiput and radiates forward, but also can start from behind her eyes and radiates backwards. The pain is a constant throbbing HA. The pt reports HAs everyday. The pt reports the HAs are constant from the time she opens her eyes to the time she goes tosleep. She now even has pain to light touch. + nausea with HA. + neck and shoulder pain with HAs. She reports + Aura. She reports + occasional blind spots. Bright light and activity make her HAs worse. Resting and being in a dark room make it worse. She reports the only thing that helps is opioids. Overall the HAs greatly limit her ADLs.    She is currently taking depakote. Gabapentin,motrin, robaxin     Pain rating: intensity  Averages 9/10 on a 0-10 scale.    Current treatments include:  She is currently taking depakote. Gabapentin,motrin, robaxin   Previous medication treatments included:  triptans    Other treatments have included:    Chiropractic: no  Acupuncture: no  TENs Unit: no  Injections: no    Past Medical History:  Past Medical History:   Diagnosis Date     ASCUS with positive high risk HPV cervical 04/28/2017    Neg 16/18     Chronic back pain      Depression with anxiety      GERD (gastroesophageal reflux disease)      H/O colposcopy with cervical biopsy 06/12/2017    ECC - EWELINA 1, Bx - Negative     Heroin addiction (H)      Marijuana  use, episodic      Migraine      Tobacco use     1/2 PPD     Past Surgical History:  Past Surgical History:   Procedure Laterality Date     NO HISTORY OF SURGERY       Medications:  Current Outpatient Prescriptions   Medication Sig Dispense Refill     IBUPROFEN PO Take 200 mg by mouth       Cholecalciferol (VITAMIN D) 2000 UNITS tablet Take 1 tablet by mouth daily       Sertraline HCl (ZOLOFT PO) Take 25 mg by mouth daily       Omeprazole (PRILOSEC PO) Take 20 mg by mouth       BusPIRone HCl (BUSPAR PO) Take 15 mg by mouth       Divalproex Sodium (DEPAKOTE PO) Take 500 mg by mouth       HydrOXYzine Pamoate (VISTARIL PO) Take 25 mg by mouth 1-2 prn       MELATONIN PO 10 mg q hs       DiphenhydrAMINE HCl (BENADRYL PO)        Methocarbamol (ROBAXIN PO) Take 500 mg by mouth 3 times daily       gabapentin (NEURONTIN) 300 MG capsule Take 1 tablet (300 mg) every night for 1-3 days, then 1 tablet twice daily for 1-3 days, then 1 tablet three times daily 90 capsule 3     metroNIDAZOLE (FLAGYL) 500 MG tablet Take 1 tablet (500 mg) by mouth 2 times daily (Patient not taking: Reported on 11/9/2017) 14 tablet 0     ondansetron (ZOFRAN ODT) 4 MG ODT tab Take 1-2 tablets (4-8 mg) by mouth every 8 hours as needed for nausea (Patient not taking: Reported on 11/9/2017) 20 tablet 1     oxyCODONE-acetaminophen (PERCOCET) 5-325 MG per tablet Take 1 tablet by mouth 2 times daily as needed for moderate to severe pain maximum 2 tablet(s) per day (Patient not taking: Reported on 10/27/2017) 18 tablet 0     ondansetron (ZOFRAN) 4 MG tablet Take 1 tablet (4 mg) by mouth every 12 hours as needed for nausea (Patient not taking: Reported on 11/9/2017) 18 tablet 0     albuterol (PROAIR HFA/PROVENTIL HFA/VENTOLIN HFA) 108 (90 BASE) MCG/ACT Inhaler Inhale 2 puffs into the lungs every 6 hours       SUMAtriptan (IMITREX) 25 MG tablet Take 1-2 tablets (25-50 mg) by mouth at onset of headache for migraine May repeat in 2 hours. Max 8 tablets/24 hours.  (Patient not taking: Reported on 2017) 18 tablet 1     traZODone (DESYREL) 100 MG tablet Take 1-2 tablets (100-200 mg) by mouth At Bedtime (Patient not taking: Reported on 2017) 90 tablet 0     alum & mag hydroxide-simethicone (MYLANTA/MAALOX) 200-200-20 MG/5ML SUSP suspension Take 15 mLs by mouth every 4 hours as needed for indigestion       Allergies:     Allergies   Allergen Reactions     Vicodin [Hydrocodone-Acetaminophen] Itching     Social History:  Home situation:alone  Occupation/Schoolinth grade  Tobacco use: yes  Alcohol use: former  Drug use: hx meth,cocaine,heroin      Family history:  Family History   Problem Relation Age of Onset     Breast Cancer No family hx of      Colon Cancer No family hx of      Family history of headaches: yes    Review of Systems:  Skin: negative  Eyes: negative  Ears/Nose/Throat: negative  Respiratory: No shortness of breath, dyspnea on exertion, cough, or hemoptysis  Cardiovascular: negative  Gastrointestinal: negative  Genitourinary: negative  Musculoskeletal: negative  Neurologic: negative  Psychiatric: negative  Hematologic/Lymphatic/Immunologic: negative  Endocrine: negative    Physical Exam:  Vitals:    17 1106   BP: 122/77   Pulse: 97   Weight: 65.8 kg (145 lb)     Exam:  Constitutional: healthy, alert and no distress  Head: normocephalic. Atraumatic.     Eyes: no redness or jaundice noted   ENT: oropharnx normal.  MMM.  Neck supple.    Cardiovascular: neg edema  Respiratory: clear   Gastrointestinal: soft, non-tender, normoactive bowel sounds   Skin: no suspicious lesions or rashes  Psychiatric: mentation appears normal and affect normal/bright    Musculoskeletal exam:  Gait/Station/Posture: wnl  Cervical spine:  + pain with extension. Diffuse TTP       Thoracic spine:  Diffuse ttp    Lumbar spine:     ROM: limited   Myofascial tenderness:  Diffuse TTP    Neurologic exam:  CN:  Cranial nerves 2-12 are normal  Motor:  5/5 UE and LE  strength  Reflexes:     Biceps:     +2   Brachioradialis   +2     Triceps:  +2       Sensory:  (upper and lower extremities):   Light touch: + pain over occiput and forehead    Allodynia: +    Dysethesia: absent   Hyperalgesia: absent     Diagnostic tests:  n/a    Assessment/Plan:  Karina Jenkins is a 32 year old female who presents with the complaints of Chronic HAs/migraines  Karina was seen today for pain.    Diagnoses and all orders for this visit:    Other migraine without status migrainosus, not intractable    Myofascial pain    Medication overuse headache        - Further procedures recommended:   -may consider cervical MEDIAL BRANCH BLOCK     - may consider supraorbital/occipital nerve blocks    - May consider botox in the future, but will not qualify until attempted other prophylactic  Medication   - Medication Management:    - consider starting Topamax for chronic migraines    - Other options include Betablockers, Ca channel blockers   - Would no prescribe opioids for chronic migraines- especially w/ hx of drug abuse   - Discussed concept of overuse HAs.  In a 2004 report from a tertiary headache center in [place] of 169 patients who were followed in the last five years of the study (ie, the triptan era), the drugs most often associated with MOH included the following [2]:   Butalbital containing combination products, 48 percent   Acetaminophen, 46 percent   Opioids, 33 percent   Aspirin 32 percent   Triptans, 18 percent   Ergotamine tartrate, 12 percent   NSAIDS other than ASA, 10 percent  - Diagnostic Studies: If decides to proceed with Cervical MEDIAL BRANCH BLOCK need a neck xray   - Urine toxicology screen today: none  - Follow up: As needed or pending interest in procedures            Total time spent was 60 minutes, and more than 50% of face to face time was spent counseling and/or coordination of care regarding principles of multidisciplinary care and medication management Chronic  HAs    Ari Bailey MD  [facility]

## 2017-11-09 NOTE — PATIENT INSTRUCTIONS
- Further procedures recommended:   -may consider cervical MEDIAL BRANCH BLOCK s    - may consider supraorbital/occipital nerve blocks    - May consider botox in the future, but will not qualify until attempted other prophylactic   Medication   - Medication Management:    - consider starting Topamax for chronic migraines    - Other options include Betablockers, Calcium channel blockers  - Diagnostic Studies: If decides to proceed with Cervical MEDIAL BRANCH BLOCK need a neck xray   - Urine toxicology screen today: none  - Follow up: As needed or pending interest in procedures    ----------------------------------------------------------------  Nurse Triage line:  [phone]   Call this number with any questions or concerns. You may leave a detailed message anytime. Calls are typically returned Monday through Friday between 8 AM and 4:30 PM. We usually get back to you within 2 business days depending on the issue/request.       Medication refills:    For non-narcotic medications, call your pharmacy directly to request a refill. The pharmacy will contact the [facility] Management Center for authorization. Please allow 3-4 days for these refills to be processed.     For narcotic refills, call the nurse triage line or send a Dextr message. Please contact us 7-10 days before your refill is due. The message MUST include the name of the specific medication(s) requested and how you would like to receive the prescription(s). The options are as follows:    Pain Clinic staff can mail the prescription to your pharmacy. Please tell us the name of the pharmacy.    You may pick the prescription up at the Pain Clinic (tell us the location) or during a clinic visit with your pain provider    Pain Clinic staff can deliver the prescription to the [facility] pharmacy in the clinic building. Please tell us the location.      Scheduling number: [phone].  Call this number to schedule or change appointments.    We believe regular  attendance is key to your success in our program.    Any time you are unable to keep your appointment we ask that you call us at least 24 hours in advance to let us know. This will allow us to offer the appointment time to another patient.

## 2017-11-09 NOTE — NURSING NOTE
"Chief Complaint   Patient presents with     Pain     Migraine         Initial /77  Pulse 97  Wt 65.8 kg (145 lb)  BMI 25.28 kg/m2 Estimated body mass index is 25.28 kg/(m^2) as calculated from the following:    Height as of 10/27/17: 1.613 m (5' 3.5\").    Weight as of this encounter: 65.8 kg (145 lb).  Medication Reconciliation: complete     Edie Aguila MA      "

## 2017-11-09 NOTE — MR AVS SNAPSHOT
After Visit Summary   11/9/2017    Karina Jenkins    MRN: [id number]           Patient Information     Date Of Birth          1985        Visit Information        Provider Department      11/9/2017 11:00 AM Ari Bailey MD [facility] [place]    - Further procedures recommended:   -may consider cervical MEDIAL BRANCH BLOCK s    - may consider supraorbital/occipital nerve blocks    - May consider botox in the future, but will not qualify until attempted other prophylactic   Medication   - Medication Management:    - consider starting Topamax for chronic migraines    - Other options include Betablockers, Calcium channel blockers  - Diagnostic Studies: If decides to proceed with Cervical MEDIAL BRANCH BLOCK need a neck xray   - Urine toxicology screen today: none  - Follow up: As needed or pending interest in procedures    ----------------------------------------------------------------  Nurse Triage line:  [phone]   Call this number with any questions or concerns. You may leave a detailed message anytime. Calls are typically returned Monday through Friday between 8 AM and 4:30 PM. We usually get back to you within 2 business days depending on the issue/request.       Medication refills:    For non-narcotic medications, call your pharmacy directly to request a refill. The pharmacy will contact the [facility] for authorization. Please allow 3-4 days for these refills to be processed.     For narcotic refills, call the nurse triage line or send a Umami message. Please contact us 7-10 days before your refill is due. The message MUST include the name of the specific medication(s) requested and how you would like to receive the prescription(s). The options are as follows:    Pain Clinic staff can mail the prescription to your pharmacy. Please tell us the name of the pharmacy.    You may pick the prescription up at the Pain Clinic (tell us  "the location) or during a clinic visit with your pain provider    Pain Clinic staff can deliver the prescription to the [facility] pharmacy in the clinic building. Please tell us the location.      Scheduling number: [phone].  Call this number to schedule or change appointments.    We believe regular attendance is key to your success in our program.    Any time you are unable to keep your appointment we ask that you call us at least 24 hours in advance to let us know. This will allow us to offer the appointment time to another patient.               Follow-ups after your visit        Who to contact     If you have questions or need follow up information about today's clinic visit or your schedule please contact [facility] directly at [phone].  Normal or non-critical lab and imaging results will be communicated to you by MyChart, letter or phone within 4 business days after the clinic has received the results. If you do not hear from us within 7 days, please contact the clinic through Keemotionhart or phone. If you have a critical or abnormal lab result, we will notify you by phone as soon as possible.  Submit refill requests through [employer] or call your pharmacy and they will forward the refill request to us. Please allow 3 business days for your refill to be completed.          Additional Information About Your Visit        In[employer] Information     [employer] lets you send messages to your doctor, view your test results, renew your prescriptions, schedule appointments and more. To sign up, go to [email or web address] . Click on \"Log in\" on the left side of the screen, which will take you to the Welcome page. Then click on \"Sign up Now\" on the right side of the page.     You will be asked to enter the access code listed below, as well as some personal information. Please follow the directions to create your username and password.     Your access code is: [email or web address]  Expires: 2/7/2018 12:01 PM     Your " access code will  in 90 days. If you need help or a new code, please call your [facility] clinic or [phone].        Care EveryWhere ID     This is your Care EveryWhere ID. This could be used by other organizations to access your [facility] medical records  [id number]        Your Vitals Were     Pulse BMI (Body Mass Index)                97 25.28 kg/m2           Blood Pressure from Last 3 Encounters:   17 122/77   10/27/17 132/80   17 119/78    Weight from Last 3 Encounters:   17 65.8 kg (145 lb)   10/27/17 63.5 kg (140 lb)   17 62.6 kg (138 lb)              Today, you had the following     No orders found for display       Primary Care Provider Office Phone # Fax #    Nicole Jose JAMSHID [phone] [address] [facility] [address]        Equal Access to Services     [facility] : Hadii aad ku hadasho Soomaali, waaxda luqadaha, qaybta kaalmada adeegyada, waxay idiin hayaan mayi khsteven olvera . So [facility] [phone].    ATENCIÓN: Si habla español, tiene a cortez disposición servicios gratuitos de asistencia lingüística. Llame al [phone].    We comply with applicable federal civil rights laws and [place] laws. We do not discriminate on the basis of race, color, national origin, age, disability, sex, sexual orientation, or gender identity.            Thank you!     Thank you for choosing [facility]  for your care. Our goal is always to provide you with excellent care. Hearing back from our patients is one way we can continue to improve our services. Please take a few minutes to complete the written survey that you may receive in the mail after your visit with us. Thank you!             Your Updated Medication List - Protect others around you: Learn how to safely use, store and throw away your medicines at www.disposemymeds.org.          This list is accurate as of: 17 12:01 PM.  Always use your most recent med list.                    Brand Name Dispense Instructions for use Diagnosis    albuterol 108 (90 BASE) MCG/ACT Inhaler    PROAIR HFA/PROVENTIL HFA/VENTOLIN HFA     Inhale 2 puffs into the lungs every 6 hours        alum & mag hydroxide-simethicone 200-200-20 MG/5ML Susp suspension    MYLANTA/MAALOX     Take 15 mLs by mouth every 4 hours as needed for indigestion    Absence of menstruation, ASCUS with positive high risk HPV cervical       BENADRYL PO       Absence of menstruation, ASCUS with positive high risk HPV cervical       BUSPAR PO      Take 15 mg by mouth    Absence of menstruation, ASCUS with positive high risk HPV cervical       DEPAKOTE PO      Take 500 mg by mouth    Absence of menstruation, ASCUS with positive high risk HPV cervical       gabapentin 300 MG capsule    NEURONTIN    90 capsule    Take 1 tablet (300 mg) every night for 1-3 days, then 1 tablet twice daily for 1-3 days, then 1 tablet three times daily    Chronic pain syndrome       IBUPROFEN PO      Take 200 mg by mouth        MELATONIN PO      10 mg q hs    Absence of menstruation, ASCUS with positive high risk HPV cervical       metroNIDAZOLE 500 MG tablet    FLAGYL    14 tablet    Take 1 tablet (500 mg) by mouth 2 times daily    Bacterial vaginal infection       ondansetron 4 MG ODT tab    ZOFRAN ODT    20 tablet    Take 1-2 tablets (4-8 mg) by mouth every 8 hours as needed for nausea    Nausea, Intractable chronic migraine without aura and with status migrainosus       ondansetron 4 MG tablet    ZOFRAN    18 tablet    Take 1 tablet (4 mg) by mouth every 12 hours as needed for nausea    Nausea       oxyCODONE-acetaminophen 5-325 MG per tablet    PERCOCET    18 tablet    Take 1 tablet by mouth 2 times daily as needed for moderate to severe pain maximum 2 tablet(s) per day    Intractable chronic migraine without aura and with status migrainosus       PRILOSEC PO      Take 20 mg by mouth    Absence of menstruation, ASCUS with positive high risk HPV cervical        ROBAXIN PO      Take 500 mg by mouth 3 times daily    Absence of menstruation, ASCUS with positive high risk HPV cervical       SUMAtriptan 25 MG tablet    IMITREX    18 tablet    Take 1-2 tablets (25-50 mg) by mouth at onset of headache for migraine May repeat in 2 hours. Max 8 tablets/24 hours.    Other migraine without status migrainosus, not intractable       traZODone 100 MG tablet    DESYREL    90 tablet    Take 1-2 tablets (100-200 mg) by mouth At Bedtime    Insomnia due to other mental disorder       VISTARIL PO      Take 25 mg by mouth 1-2 prn    Absence of menstruation, ASCUS with positive high risk HPV cervical       vitamin D 2000 UNITS tablet      Take 1 tablet by mouth daily        ZOLOFT PO      Take 25 mg by mouth daily    Absence of menstruation, ASCUS with positive high risk HPV cervical

## 2017-12-08 ENCOUNTER — OFFICE VISIT (OUTPATIENT)
Dept: FAMILY MEDICINE | Facility: CLINIC | Age: 32
End: 2017-12-08
Payer: MEDICAID

## 2017-12-08 VITALS
SYSTOLIC BLOOD PRESSURE: 136 MMHG | OXYGEN SATURATION: 97 % | WEIGHT: 140 LBS | HEIGHT: 64 IN | HEART RATE: 105 BPM | BODY MASS INDEX: 23.9 KG/M2 | DIASTOLIC BLOOD PRESSURE: 88 MMHG | RESPIRATION RATE: 18 BRPM | TEMPERATURE: 99.5 F

## 2017-12-08 DIAGNOSIS — R11.0 NAUSEA: ICD-10-CM

## 2017-12-08 DIAGNOSIS — F51.05 INSOMNIA DUE TO OTHER MENTAL DISORDER: ICD-10-CM

## 2017-12-08 DIAGNOSIS — G89.4 CHRONIC PAIN SYNDROME: ICD-10-CM

## 2017-12-08 DIAGNOSIS — N89.8 VAGINAL DISCHARGE: ICD-10-CM

## 2017-12-08 DIAGNOSIS — B96.89 BACTERIAL VAGINOSIS: ICD-10-CM

## 2017-12-08 DIAGNOSIS — N76.0 BACTERIAL VAGINOSIS: ICD-10-CM

## 2017-12-08 DIAGNOSIS — F99 INSOMNIA DUE TO OTHER MENTAL DISORDER: ICD-10-CM

## 2017-12-08 DIAGNOSIS — Z11.3 SCREENING EXAMINATION FOR VENEREAL DISEASE: ICD-10-CM

## 2017-12-08 DIAGNOSIS — N92.6 MISSED PERIOD: Primary | ICD-10-CM

## 2017-12-08 DIAGNOSIS — G43.711 INTRACTABLE CHRONIC MIGRAINE WITHOUT AURA AND WITH STATUS MIGRAINOSUS: ICD-10-CM

## 2017-12-08 LAB
BETA HCG QUAL IFA URINE: NEGATIVE
SPECIMEN SOURCE: ABNORMAL
WET PREP SPEC: ABNORMAL

## 2017-12-08 PROCEDURE — 87389 HIV-1 AG W/HIV-1&-2 AB AG IA: CPT | Performed by: NURSE PRACTITIONER

## 2017-12-08 PROCEDURE — 86696 HERPES SIMPLEX TYPE 2 TEST: CPT | Performed by: NURSE PRACTITIONER

## 2017-12-08 PROCEDURE — 36415 COLL VENOUS BLD VENIPUNCTURE: CPT | Performed by: NURSE PRACTITIONER

## 2017-12-08 PROCEDURE — 87591 N.GONORRHOEAE DNA AMP PROB: CPT | Performed by: NURSE PRACTITIONER

## 2017-12-08 PROCEDURE — 87210 SMEAR WET MOUNT SALINE/INK: CPT | Performed by: NURSE PRACTITIONER

## 2017-12-08 PROCEDURE — 99214 OFFICE O/P EST MOD 30 MIN: CPT | Performed by: NURSE PRACTITIONER

## 2017-12-08 PROCEDURE — 86695 HERPES SIMPLEX TYPE 1 TEST: CPT | Performed by: NURSE PRACTITIONER

## 2017-12-08 PROCEDURE — 86780 TREPONEMA PALLIDUM: CPT | Performed by: NURSE PRACTITIONER

## 2017-12-08 PROCEDURE — 84703 CHORIONIC GONADOTROPIN ASSAY: CPT | Performed by: NURSE PRACTITIONER

## 2017-12-08 PROCEDURE — 87491 CHLMYD TRACH DNA AMP PROBE: CPT | Performed by: NURSE PRACTITIONER

## 2017-12-08 PROCEDURE — 86803 HEPATITIS C AB TEST: CPT | Performed by: NURSE PRACTITIONER

## 2017-12-08 RX ORDER — METRONIDAZOLE 500 MG/1
500 TABLET ORAL 2 TIMES DAILY
Qty: 14 TABLET | Refills: 0 | Status: SHIPPED | OUTPATIENT
Start: 2017-12-08

## 2017-12-08 RX ORDER — GABAPENTIN 300 MG/1
CAPSULE ORAL
Qty: 90 CAPSULE | Refills: 0 | Status: SHIPPED | OUTPATIENT
Start: 2017-12-08

## 2017-12-08 RX ORDER — TRAZODONE HYDROCHLORIDE 100 MG/1
100-200 TABLET ORAL AT BEDTIME
Qty: 90 TABLET | Refills: 0 | Status: SHIPPED | OUTPATIENT
Start: 2017-12-08

## 2017-12-08 RX ORDER — ONDANSETRON 4 MG/1
4-8 TABLET, ORALLY DISINTEGRATING ORAL EVERY 8 HOURS PRN
Qty: 20 TABLET | Refills: 0 | Status: SHIPPED | OUTPATIENT
Start: 2017-12-08 | End: 2018-02-28

## 2017-12-08 NOTE — PROGRESS NOTES
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  Additional history: as documented        Reviewed and updated as needed this visit by clinical staff  Tobacco  Allergies  Meds  Med Hx  Surg Hx  Fam Hx  Soc Hx      Reviewed and updated as needed this visit by Provider         ROS:  Constitutional, HEENT, cardiovascular, pulmonary, GI, , musculoskeletal, neuro, skin, endocrine and psych systems are negative, except as otherwise noted.      OBJECTIVE:   /88  Pulse 105  Temp 99.5  F (37.5  C) (Tympanic)  Resp 18  Ht 5' 3.5\" (1.613 m)  Wt 140 lb (63.5 kg)  LMP 11/15/2017  SpO2 97%  BMI 24.41 kg/m2  Body mass index is 24.41 kg/(m^2).     GENERAL: healthy, alert and no distress  EYES: Eyes grossly normal to inspection, PERRL and conjunctivae and sclerae normal  NECK: no adenopathy, no asymmetry, masses, or scars and thyroid normal to palpation  RESP: lungs clear to auscultation - no rales, rhonchi or wheezes  CV: regular rate and rhythm, normal S1 S2, no S3 or S4, no murmur, click or rub, no peripheral edema and peripheral pulses strong  ABDOMEN: soft, nontender, no hepatosplenomegaly, no masses and bowel sounds normal  PSYCH: mentation appears normal, affect normal/bright      Diagnostic Test Results:  See orders    ASSESSMENT/PLAN:         ICD-10-CM    1. Missed period N92.6 Beta HCG qual IFA urine - [facility] and [place]   2. Screening examination for venereal disease Z11.3 NEISSERIA GONORRHOEA PCR     CHLAMYDIA TRACHOMATIS PCR     Anti Treponema     HIV " Antigen Antibody Combo     **Hepatitis C Screen Reflex to RNA FUTURE anytime     Herpes Simplex Virus 1 and 2 IgG     CANCELED: HSV 1 and 2 DNA by PCR   3. Vaginal discharge N89.8 Wet prep   4. Bacterial vaginosis N76.0 metroNIDAZOLE (FLAGYL) 500 MG tablet    B96.89    5. Chronic pain syndrome G89.4 gabapentin (NEURONTIN) 300 MG capsule   6. Nausea R11.0 ondansetron (ZOFRAN ODT) 4 MG ODT tab   7. 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 Insomnia due to other mental disorder F51.05 traZODone (DESYREL) 100 MG tablet    F99        See Patient Instructions    Nicole Jose, KYRIE  [facility]

## 2017-12-08 NOTE — MR AVS SNAPSHOT
After Visit Summary   2017    Karina Mac [place]    MRN: [id number]           Patient Information     Date Of Birth          1985        Visit Information        Provider Department      2017 2:40 PM Nicole Jose NP [facility]        Today's Diagnoses     Missed period    -  1    Screening examination for venereal disease        Vaginal discharge        Bacterial vaginosis          Care Instructions      Bacterial Vaginosis    You have a vaginal infection called bacterial vaginosis (BV). Both good and bad bacteria are present in a healthy vagina. BV occurs when these bacteria get out of balance. The number of bad bacteria increase. And the number of good bacteria decrease.  BV may or may not cause symptoms. If symptoms do occur, they can include:    Thin, gray, milky-white, or sometimes green discharge    Unpleasant odor or  fishy  smell    Itching, burning, or pain in or around the vagina  It is not known what causes BV, but certain factors can make the problem more likely. This can include:    Douching    Having sex with a new partner    Having sex with more than one partner  BV will sometimes go away on its own. But treatment is usually recommended. This is because untreated BV can increase the risk of more serious health problems such as:    Pelvic inflammatory disease (PID)     delivery (giving birth to a baby early if you re pregnant)    HIV and certain other sexually transmitted diseases (STDs)    Infection after surgery on the reproductive organs  Home care  General care    BV is most often treated with medicines called antibiotics. These may be given as pills or as a vaginal cream. If antibiotics are prescribed, be sure to use them exactly as directed. Also, be sure to complete all of the medicine, even if your symptoms go away.    Avoid douching or having sex during treatment.    If you have sex with a female partner, ask your healthcare provider if she  should also be treated.  Prevention    Limit or avoid douching.    Avoid having sex. If you do have sex, then take steps to lower your risk:    Use condoms when having sex.    Limit the number of partners you have sex with.  Follow-up care  Follow up with your healthcare provider, or as advised.  When to seek medical advice  Call your healthcare provider right away if:    You have a fever of 100.4 F (38 C) or higher, or as directed by your provider.    Your symptoms worsen, or they don t go away within a few days of starting treatment.    You have new pain in the lower belly or pelvic region.    You have side effects that bother you or a reaction to the pills or cream you re prescribed.    You or any partners you have sex with have new symptoms, such as a rash, joint pain, or sores.  Date Last Reviewed: 7/30/2015 2000-2017 [employer]. [address] [place] [address]. All rights reserved. This information is not intended as a substitute for professional medical care. Always follow your healthcare professional's instructions.                Follow-ups after your visit        Follow-up notes from your care team     Return if symptoms worsen or fail to improve.      Who to contact     Normal or non-critical lab and imaging results will be communicated to you by [employer], letter or phone within 4 business days after the clinic has received the results. If you do not hear from us within 7 days, please contact the clinic through [employer] or phone. If you have a critical or abnormal lab result, we will notify you by phone as soon as possible.  Submit refill requests through [employer] or call your pharmacy and they will forward the refill request to us. Please allow 3 business days for your refill to be completed.          If you need to speak with a  for additional information , please call: [phone]             Additional Information About Your Visit        [employer]win Information   "   "[employer] lets you send messages to your doctor, view your test results, renew your prescriptions, schedule appointments and more. To sign up, go to [email or web address] . Click on \"Log in\" on the left side of the screen, which will take you to the Welcome page. Then click on \"Sign up Now\" on the right side of the page.     You will be asked to enter the access code listed below, as well as some personal information. Please follow the directions to create your username and password.     Your access code is: [email or web address]  Expires: 2018 12:01 PM     Your access code will  in 90 days. If you need help or a new code, please call your [facility] clinic or [phone].        Care EveryWhere ID     This is your Care EveryWhere ID. This could be used by other organizations to access your [facility] medical records  [id number]        Your Vitals Were     Pulse Temperature Respirations Height Last Period Pulse Oximetry    105 99.5  F (37.5  C) (Tympanic) 18 5' 3.5\" (1.613 m) 11/15/2017 97%    BMI (Body Mass Index)                   24.41 kg/m2            Blood Pressure from Last 3 Encounters:   17 136/88   17 122/77   10/27/17 132/80    Weight from Last 3 Encounters:   17 140 lb (63.5 kg)   17 145 lb (65.8 kg)   10/27/17 140 lb (63.5 kg)              We Performed the Following     **Hepatitis C Screen Reflex to RNA FUTURE anytime     Anti Treponema     Beta HCG qual IFA urine - [facility] and [facility] [place]     CHLAMYDIA TRACHOMATIS PCR     Herpes Simplex Virus 1 and 2 IgG     HIV Antigen Antibody Combo     NEISSERIA GONORRHOEA PCR     Wet prep          Where to get your medicines      These medications were sent to [id number] [address] [place], [place] [id number] AT [facility] & [place] [address] [facility] [address]     Phone:  [phone]     metroNIDAZOLE 500 MG tablet          Primary Care Provider Office Phone # Fax #    Nicole Jose [phone] " [phone]       [address]  [place] [address]        Equal Access to Services     LILLIGAYLE JOHN : Hadii aad ku hadangelo Soparkerali, waaxda luqadaha, qaybta kaalmada adecarly, ervin uzmain hayaan gatotomasa allen lalakshminehemiah escobar. So [facility] [phone].    ATENCIÓN: Si habla español, tiene a cortez disposición servicios gratuitos de asistencia lingüística. Llame al [phone].    We comply with applicable federal civil rights laws and [place] laws. We do not discriminate on the basis of race, color, national origin, age, disability, sex, sexual orientation, or gender identity.            Thank you!     Thank you for choosing [facility]  for your care. Our goal is always to provide you with excellent care. Hearing back from our patients is one way we can continue to improve our services. Please take a few minutes to complete the written survey that you may receive in the mail after your visit with us. Thank you!             Your Updated Medication List - Protect others around you: Learn how to safely use, store and throw away your medicines at www.disposemymeds.org.          This list is accurate as of: 12/8/17  3:53 PM.  Always use your most recent med list.                   Brand Name Dispense Instructions for use Diagnosis    albuterol 108 (90 BASE) MCG/ACT Inhaler    PROAIR HFA/PROVENTIL HFA/VENTOLIN HFA     Inhale 2 puffs into the lungs every 6 hours        alum & mag hydroxide-simethicone 200-200-20 MG/5ML Susp suspension    MYLANTA/MAALOX     Take 15 mLs by mouth every 4 hours as needed for indigestion    Absence of menstruation, ASCUS with positive high risk HPV cervical       BENADRYL PO       Absence of menstruation, ASCUS with positive high risk HPV cervical       BUSPAR PO      Take 15 mg by mouth    Absence of menstruation, ASCUS with positive high risk HPV cervical       DEPAKOTE PO      Take 500 mg by mouth    Absence of menstruation, ASCUS with positive high risk HPV cervical        gabapentin 300 MG capsule    NEURONTIN    90 capsule    Take 1 tablet (300 mg) every night for 1-3 days, then 1 tablet twice daily for 1-3 days, then 1 tablet three times daily    Chronic pain syndrome       IBUPROFEN PO      Take 200 mg by mouth        MELATONIN PO      10 mg q hs    Absence of menstruation, ASCUS with positive high risk HPV cervical       metroNIDAZOLE 500 MG tablet    FLAGYL    14 tablet    Take 1 tablet (500 mg) by mouth 2 times daily    Bacterial vaginosis       ondansetron 4 MG ODT tab    ZOFRAN ODT    20 tablet    Take 1-2 tablets (4-8 mg) by mouth every 8 hours as needed for nausea    Nausea, Intractable chronic migraine without aura and with status migrainosus       ondansetron 4 MG tablet    ZOFRAN    18 tablet    Take 1 tablet (4 mg) by mouth every 12 hours as needed for nausea    Nausea       oxyCODONE-acetaminophen 5-325 MG per tablet    PERCOCET    18 tablet    Take 1 tablet by mouth 2 times daily as needed for moderate to severe pain maximum 2 tablet(s) per day    Intractable chronic migraine without aura and with status migrainosus       PRILOSEC PO      Take 20 mg by mouth    Absence of menstruation, ASCUS with positive high risk HPV cervical       ROBAXIN PO      Take 500 mg by mouth 3 times daily    Absence of menstruation, ASCUS with positive high risk HPV cervical       SUMAtriptan 25 MG tablet    IMITREX    18 tablet    Take 1-2 tablets (25-50 mg) by mouth at onset of headache for migraine May repeat in 2 hours. Max 8 tablets/24 hours.    Other migraine without status migrainosus, not intractable       traZODone 100 MG tablet    DESYREL    90 tablet    Take 1-2 tablets (100-200 mg) by mouth At Bedtime    Insomnia due to other mental disorder       VISTARIL PO      Take 25 mg by mouth 1-2 prn    Absence of menstruation, ASCUS with positive high risk HPV cervical       vitamin D 2000 UNITS tablet      Take 1 tablet by mouth daily        ZOLOFT PO      Take 25 mg by mouth daily     Absence of menstruation, ASCUS with positive high risk HPV cervical

## 2017-12-08 NOTE — LETTER
December 12, 2017      Karina Mac [place]  [address] [place] [address]        Dear ,    We are writing to inform you of your test results.    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 We only treat this when the lesions are present, and future HSV2 tests will always remain positive.     Resulted Orders   Beta HCG qual IFA urine - [facility] [place]   Result Value Ref Range    Beta HCG Qual IFA Urine Negative NEG^Negative      NEISSERIA GONORRHOEA PCR   Result Value Ref Range    Specimen Descrip Urine     N Gonorrhea PCR Negative NEG^Negative      Comment:      Negative for N. gonorrhoeae rRNA by transcription mediated amplification.  A negative result by transcription mediated amplification does not preclude   the presence of N. gonorrhoeae infection because results are dependent on   proper and adequate collection, absence of inhibitors, and sufficient rRNA to   be detected.     CHLAMYDIA TRACHOMATIS PCR   Result Value Ref Range    Specimen Description Urine     Chlamydia Trachomatis PCR Negative NEG^Negative      Comment:      Negative for C. trachomatis rRNA by transcription mediated amplification.  A negative result by transcription mediated amplification does not preclude   the presence of C. trachomatis infection because results are dependent on   proper and adequate collection, absence of inhibitors, and sufficient rRNA to   be detected.     Anti Treponema   Result Value Ref Range    Treponema pallidum Antibody Negative NEG^Negative   HIV Antigen Antibody Combo   Result Value Ref Range    HIV Antigen Antibody Combo Nonreactive NR^Nonreactive          Comment:      HIV-1 p24 Ag & HIV-1/HIV-2 Ab Not Detected   **Hepatitis C Screen Reflex to RNA FUTURE anytime   Result Value Ref Range    Hepatitis C Antibody Nonreactive NR^Nonreactive      Comment:      Assay performance characteristics have not been established for newborns,    infants, and children     Wet prep   Result Value Ref Range    Specimen Description Vagina     Wet Prep No Trichomonas seen     Wet Prep Clue cells seen (A)     Wet Prep No yeast seen    Herpes Simplex Virus 1 and 2 IgG   Result Value Ref Range    Herpes Simplex Virus Type 1 IgG <0.2 0.0 - 0.8 AI      Comment:      No HSV-1 IgG antibodies detected.  Antibody index (AI) values reflect qualitative changes in antibody   concentration that cannot be directly associated with clinical condition or   disease state.      Herpes Simplex Virus Type 2 IgG 2.6 (H) 0.0 - 0.8 AI      Comment:      Positive.  IgG antibody to HSV-2 detected.  Antibody index (AI) values reflect qualitative changes in antibody   concentration that cannot be directly associated with clinical condition or   disease state.         If you have any questions or concerns, please call the clinic at [phone].       Sincerely,        Nicole Jose NP/pacoo

## 2017-12-08 NOTE — PROGRESS NOTES
Results discussed directly with patient while patient was present. Any further details documented in the note.   Nicole Jose NP

## 2017-12-08 NOTE — PATIENT INSTRUCTIONS
Bacterial Vaginosis    You have a vaginal infection called bacterial vaginosis (BV). Both good and bad bacteria are present in a healthy vagina. BV occurs when these bacteria get out of balance. The number of bad bacteria increase. And the number of good bacteria decrease.  BV may or may not cause symptoms. If symptoms do occur, they can include:    Thin, gray, milky-white, or sometimes green discharge    Unpleasant odor or  fishy  smell    Itching, burning, or pain in or around the vagina  It is not known what causes BV, but certain factors can make the problem more likely. This can include:    Douching    Having sex with a new partner    Having sex with more than one partner  BV will sometimes go away on its own. But treatment is usually recommended. This is because untreated BV can increase the risk of more serious health problems such as:    Pelvic inflammatory disease (PID)     delivery (giving birth to a baby early if you re pregnant)    HIV and certain other sexually transmitted diseases (STDs)    Infection after surgery on the reproductive organs  Home care  General care    BV is most often treated with medicines called antibiotics. These may be given as pills or as a vaginal cream. If antibiotics are prescribed, be sure to use them exactly as directed. Also, be sure to complete all of the medicine, even if your symptoms go away.    Avoid douching or having sex during treatment.    If you have sex with a female partner, ask your healthcare provider if she should also be treated.  Prevention    Limit or avoid douching.    Avoid having sex. If you do have sex, then take steps to lower your risk:    Use condoms when having sex.    Limit the number of partners you have sex with.  Follow-up care  Follow up with your healthcare provider, or as advised.  When to seek medical advice  Call your healthcare provider right away if:    You have a fever of 100.4 F (38 C) or higher, or as directed by your  provider.    Your symptoms worsen, or they don t go away within a few days of starting treatment.    You have new pain in the lower belly or pelvic region.    You have side effects that bother you or a reaction to the pills or cream you re prescribed.    You or any partners you have sex with have new symptoms, such as a rash, joint pain, or sores.  Date Last Reviewed: 7/30/2015 2000-2017 [employer]. [address] [place] [address], [place] [address]. All rights reserved. This information is not intended as a substitute for professional medical care. Always follow your healthcare professional's instructions.

## 2017-12-09 LAB
HSV1 IGG SERPL QL IA: <0.2 AI (ref 0–0.8)
HSV2 IGG SERPL QL IA: 2.6 AI (ref 0–0.8)
T PALLIDUM IGG+IGM SER QL: NEGATIVE

## 2017-12-11 LAB
HCV AB SERPL QL IA: NONREACTIVE
HIV 1+2 AB+HIV1 P24 AG SERPL QL IA: NONREACTIVE

## 2017-12-12 NOTE — PROGRESS NOTES
Please send a letter to the patient with the results. 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  We only treat this when the lesions are present, and future HSV2 tests will always remain positive. Follow up if you have any questions/ concerns.    KYRIE Young

## 2018-01-02 ENCOUNTER — OFFICE VISIT (OUTPATIENT)
Dept: FAMILY MEDICINE | Facility: CLINIC | Age: 33
End: 2018-01-02
Payer: MEDICAID

## 2018-01-02 ENCOUNTER — RADIANT APPOINTMENT (OUTPATIENT)
Dept: GENERAL RADIOLOGY | Facility: CLINIC | Age: 33
End: 2018-01-02
Attending: NURSE PRACTITIONER
Payer: MEDICAID

## 2018-01-02 VITALS
HEART RATE: 90 BPM | HEIGHT: 64 IN | BODY MASS INDEX: 23.32 KG/M2 | TEMPERATURE: 97.5 F | DIASTOLIC BLOOD PRESSURE: 88 MMHG | WEIGHT: 136.6 LBS | SYSTOLIC BLOOD PRESSURE: 137 MMHG

## 2018-01-02 DIAGNOSIS — F99 INSOMNIA DUE TO OTHER MENTAL DISORDER: ICD-10-CM

## 2018-01-02 DIAGNOSIS — M25.562 LEFT KNEE PAIN, UNSPECIFIED CHRONICITY: ICD-10-CM

## 2018-01-02 DIAGNOSIS — F31.81 BIPOLAR 2 DISORDER (H): ICD-10-CM

## 2018-01-02 DIAGNOSIS — M79.642 PAIN OF LEFT HAND: ICD-10-CM

## 2018-01-02 DIAGNOSIS — K21.9 GASTROESOPHAGEAL REFLUX DISEASE, ESOPHAGITIS PRESENCE NOT SPECIFIED: ICD-10-CM

## 2018-01-02 DIAGNOSIS — G43.711 INTRACTABLE CHRONIC MIGRAINE WITHOUT AURA AND WITH STATUS MIGRAINOSUS: ICD-10-CM

## 2018-01-02 DIAGNOSIS — M54.2 NECK PAIN: ICD-10-CM

## 2018-01-02 DIAGNOSIS — M54.50 MIDLINE LOW BACK PAIN WITHOUT SCIATICA, UNSPECIFIED CHRONICITY: ICD-10-CM
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
## 2018-01-02 DIAGNOSIS — F51.05 INSOMNIA DUE TO OTHER MENTAL DISORDER: ICD-10-CM

## 2018-01-02 DIAGNOSIS — G89.4 CHRONIC PAIN SYNDROME: ICD-10-CM

## 2018-01-02 DIAGNOSIS — R11.0 NAUSEA: ICD-10-CM

## 2018-01-02 DIAGNOSIS — F41.9 ANXIETY: ICD-10-CM

## 2018-01-02 PROCEDURE — 99214 OFFICE O/P EST MOD 30 MIN: CPT | Performed by: NURSE PRACTITIONER

## 2018-01-02 PROCEDURE — 73130 X-RAY EXAM OF HAND: CPT | Mod: LT

## 2018-01-02 PROCEDURE — 72040 X-RAY EXAM NECK SPINE 2-3 VW: CPT | Mod: FY

## 2018-01-02 PROCEDURE — 73562 X-RAY EXAM OF KNEE 3: CPT | Mod: LT

## 2018-01-02 PROCEDURE — 72100 X-RAY EXAM L-S SPINE 2/3 VWS: CPT | Mod: FY

## 2018-01-02 ASSESSMENT — ANXIETY QUESTIONNAIRES
5. BEING SO RESTLESS THAT IT IS HARD TO SIT STILL: MORE THAN HALF THE DAYS
GAD7 TOTAL SCORE: 18
7. FEELING AFRAID AS IF SOMETHING AWFUL MIGHT HAPPEN: MORE THAN HALF THE DAYS
1. FEELING NERVOUS, ANXIOUS, OR ON EDGE: NEARLY EVERY DAY
IF YOU CHECKED OFF ANY PROBLEMS ON THIS QUESTIONNAIRE, HOW DIFFICULT HAVE THESE PROBLEMS MADE IT FOR YOU TO DO YOUR WORK, TAKE CARE OF THINGS AT HOME, OR GET ALONG WITH OTHER PEOPLE: EXTREMELY DIFFICULT
2. NOT BEING ABLE TO STOP OR CONTROL WORRYING: NEARLY EVERY DAY
6. BECOMING EASILY ANNOYED OR IRRITABLE: NEARLY EVERY DAY
3. WORRYING TOO MUCH ABOUT DIFFERENT THINGS: NEARLY EVERY DAY

## 2018-01-02 ASSESSMENT — PATIENT HEALTH QUESTIONNAIRE - PHQ9
5. POOR APPETITE OR OVEREATING: MORE THAN HALF THE DAYS
SUM OF ALL RESPONSES TO PHQ QUESTIONS 1-9: 20

## 2018-01-02 NOTE — MR AVS SNAPSHOT
After Visit Summary   1/2/2018    Karina Victoriaber    MRN: [id number]           Patient Information     Date Of Birth          1985        Visit Information        Provider Department      1/2/2018 2:00 PM Nicole Jose NP [facility]        Today's Diagnoses     Fall, initial encounter    -  1    Insomnia due to other mental disorder        Nausea        Intractable chronic migraine without aura and with status migrainosus        Neck pain        Midline low back pain without sciatica, unspecified chronicity        Left knee pain, unspecified chronicity        Pain of left hand        Chronic pain syndrome        Bipolar 2 disorder (H)        Anxiety        Gastroesophageal reflux disease, esophagitis presence not specified          Care Instructions      Back Pain (Acute or Chronic)    Back pain is one of the most common problems. The good news is that most people feel better in 1 to 2 weeks, and most of the rest in 1 to 2 months. Most people can remain active.  People experience and describe pain differently; not everyone is the same.    The pain can be sharp, stabbing, shooting, aching, cramping or burning.    Movement, standing, bending, lifting, sitting, or walking may worsen pain.    It can be localized to one spot or area, or it can be more generalized.    It can spread or radiate upwards, to the front, or go down your arms or legs (sciatica).    It can cause muscle spasm.  Most of the time, mechanical problems with the muscles or spine cause the pain. Mechanical problems are usually caused by an injury to the muscles or ligaments. While illness can cause back pain, it is usually not caused by a serious illness. Mechanical problems include:     Physical activity such as sports, exercise, work, or normal activity    Overexertion, lifting, pushing, pulling incorrectly or too aggressively    Sudden twisting, bending, or stretching from an accident, or accidental  movement    Poor posture    Stretching or moving wrong, without noticing pain at the time    Poor coordination, lack of regular exercise (check with your doctor about this)    Spinal disc disease or arthritis    Stress  Pain can also be related to pregnancy, or illness like appendicitis, bladder or kidney infections, pelvic infections, and many other things.  Acute back pain usually gets better in 1 to 2 weeks. Back pain related to disk disease, arthritis in the spinal joints or spinal stenosis (narrowing of the spinal canal) can become chronic and last for months or years.  Unless you had a physical injury (for example, a car accident or fall) X-rays are usually not needed for the initial evaluation of back pain. If pain continues and does not respond to medical treatment, X-rays and other tests may be needed.  Home care  Try these home care recommendations:    When in bed, try to find a position of comfort. A firm mattress is best. Try lying flat on your back with pillows under your knees. You can also try lying on your side with your knees bent up towards your chest and a pillow between your knees.    At first, do not try to stretch out the sore spots. If there is a strain, it is not like the good soreness you get after exercising without an injury. In this case, stretching may make it worse.    Avoid prolong sitting, long car rides, or travel. This puts more stress on the lower back than standing or walking.    During the first 24 to 72 hours after an acute injury or flare up of chronic back pain, apply an ice pack to the painful area for 20 minutes and then remove it for 20 minutes. Do this over a period of 60 to 90 minutes or several times a day. This will reduce swelling and pain. Wrap the ice pack in a thin towel or plastic to protect your skin.    You can start with ice, then switch to heat. Heat (hot shower, hot bath, or heating pad) reduces pain and works well for muscle spasms. Heat can be applied to the  painful area for 20 minutes then remove it for 20 minutes. Do this over a period of 60 to 90 minutes or several times a day. Do not sleep on a heating pad. It can lead to skin burns or tissue damage.    You can alternate ice and heat therapy. Talk with your doctor about the best treatment for your back pain.    Therapeutic massage can help relax the back muscles without stretching them.    Be aware of safe lifting methods and do not lift anything without stretching first.  Medicines  Talk to your doctor before using medicine, especially if you have other medical problems or are taking other medicines.    You may use over-the-counter medicine as directed on the bottle to control pain, unless another pain medicine was prescribed. If you have chronic conditions like diabetes, liver or kidney disease, stomach ulcers, or gastrointestinal bleeding, or are taking blood thinners, talk to your doctor before taking any medicine.    Be careful if you are given a prescription medicines, narcotics, or medicine for muscle spasms. They can cause drowsiness, affect your coordination, reflexes, and judgement. Do not drive or operate heavy machinery.  Follow-up care  Follow up with your healthcare provider, or as advised.   A radiologist will review any X-rays that were taken. Your provide will notify you of any new findings that may affect your care.  Call 911  Call emergency services if any of the following occur:    Trouble breathing    Confusion    Very drowsy or trouble awakening    Fainting or loss of consciousness    Rapid or very slow heart rate    Loss of bowel or bladder control  When to seek medical advice  Call your healthcare provider right away if any of these occur:     Pain becomes worse or spreads to your legs    Weakness or numbness in one or both legs    Numbness in the groin or genital area  Date Last Reviewed: 7/1/2016 2000-2017 [employer]. [address] [place] [id number]. All rights  "reserved. This information is not intended as a substitute for professional medical care. Always follow your healthcare professional's instructions.        The Cycle of Chronic Pain  Pain can affect virtually all parts of your life. Your sleep, mood, activity, and energy level can all be disrupted by pain. Being tired, depressed, and out of shape can make the pain worse and harder to cope with. So a \"pain cycle\" begins.        Note for family and friends  It can be difficult to help care for a friend or family member with chronic pain. Talk to a healthcare provider or mental health professional who can help you learn how to care for yourself and your loved one in a healthy manner.   Date Last Reviewed: 5/1/2017 2000-2017 [employer]. [address] [place] [address], [place] [address]. All rights reserved. This information is not intended as a substitute for professional medical care. Always follow your healthcare professional's instructions.        Medicine for Pain  Medicines can help to block pain, decrease inflammation, and treat related problems. More than one medicine may be used to treat your pain. Medicines may be changed as you feel better, or if they cause side effects.  Medicines What they do Possible side effects   Non-opioid NSAIDs, aspirin, acetaminophen Reduce pain chemicals at the site of pain. NSAIDs can reduce joint and soft tissue inflammation. Nausea, stomach pain, ulcers, indigestion, bleeding, kidney, and liver problems. Certain NSAIDs may increase the risk for cardiovascular disease in some people. Talk with your healthcare provider.   Opioids (morphine and similar medicines often called narcotics) Reduce feelings or perception of pain. Used for moderate to severe pain. Nausea, vomiting, itching, drowsiness, constipation, slowed breathing   Other medicines (corticosteroids, antinausea, antidepressant, and antiseizure medicines) Reduce swelling, burning or tingling pain, or certain side " effects of pain medicines, such as nausea or vomiting Your healthcare provider will explain the possible side effects of these medicines.   Anesthetics (local, injected) include lidocaine, benzocaine, and medicines used by anesthesiologists Stop pain signals from reaching the brain by blocking feeling in the treated area Nausea, low blood pressure, fever, slowed breathing, fainting, seizures, heart attack   When to call the healthcare provider  Call your healthcare provider right away (or have a family member call) if you have:    Unrelieved pain    Side effects, including constipation or uncontrolled nausea, that interfere with daily activities  If you have extreme sleepiness or breathing problems, call 911.   Other precautions    Ask your healthcare provider or pharmacist how to get rid of your pain medicines safely when you stop using them.    Never share your pain medicines with anyone.    Store your medicines in a safe place so they can t be stolen. If you think your medicine has been stolen or lost, tell your healthcare provider right away.  Date Last Reviewed: 5/1/2017 2000-2017 [employer]. [address] [place] [address], [place] [address]. All rights reserved. This information is not intended as a substitute for professional medical care. Always follow your healthcare professional's instructions.                Follow-ups after your visit        Follow-up notes from your care team     Return if symptoms worsen or fail to improve.      Who to contact     Normal or non-critical lab and imaging results will be communicated to you by MyChart, letter or phone within 4 business days after the clinic has received the results. If you do not hear from us within 7 days, please contact the clinic through MyChart or phone. If you have a critical or abnormal lab result, we will notify you by phone as soon as possible.  Submit refill requests through [employer] or call your pharmacy and they will forward the refill  "request to us. Please allow 3 business days for your refill to be completed.          If you need to speak with a  for additional information , please call: [phone]             Additional Information About Your Visit        [email or web address] Information     [email or web address] lets you send messages to your doctor, view your test results, renew your prescriptions, schedule appointments and more. To sign up, go to [email or web address] . Click on \"Log in\" on the left side of the screen, which will take you to the Welcome page. Then click on \"Sign up Now\" on the right side of the page.     You will be asked to enter the access code listed below, as well as some personal information. Please follow the directions to create your username and password.     Your access code is: [email or web address]  Expires: 2018 12:01 PM     Your access code will  in 90 days. If you need help or a new code, please call your [facility] clinic or [phone].        Care EveryWhere ID     This is your Care EveryWhere ID. This could be used by other organizations to access your [facility] medical records  [id number]        Your Vitals Were     Last Period                   11/15/2017            Blood Pressure from Last 3 Encounters:   17 136/88   17 122/77   10/27/17 132/80    Weight from Last 3 Encounters:   17 140 lb (63.5 kg)   17 145 lb (65.8 kg)   10/27/17 140 lb (63.5 kg)               Primary Care Provider Office Phone # Fax #    Nicole Jose [phone]       [address] [facility] [address]        Equal Access to Services     [facility] : Hadii samantha schmid Somarsha, waaxda luqadaha, qaybta kaalmada adeegyada, ervin escobar. So [facility] [phone].    ATENCIÓN: Si habla español, tiene a cortez disposición servicios gratuitos de asistencia lingüística. Llame al [phone].    We comply with applicable federal civil rights laws and [place] laws. We do " not discriminate on the basis of race, color, national origin, age, disability, sex, sexual orientation, or gender identity.            Thank you!     Thank you for choosing [facility]  for your care. Our goal is always to provide you with excellent care. Hearing back from our patients is one way we can continue to improve our services. Please take a few minutes to complete the written survey that you may receive in the mail after your visit with us. Thank you!             Your Updated Medication List - Protect others around you: Learn how to safely use, store and throw away your medicines at www.disposemymeds.org.          This list is accurate as of: 1/2/18  3:23 PM.  Always use your most recent med list.                   Brand Name Dispense Instructions for use Diagnosis    albuterol 108 (90 BASE) MCG/ACT Inhaler    PROAIR HFA/PROVENTIL HFA/VENTOLIN HFA     Inhale 2 puffs into the lungs every 6 hours        BENADRYL PO       Absence of menstruation, ASCUS with positive high risk HPV cervical       BUSPAR PO      Take 15 mg by mouth    Absence of menstruation, ASCUS with positive high risk HPV cervical       DEPAKOTE PO      Take 500 mg by mouth    Absence of menstruation, ASCUS with positive high risk HPV cervical       gabapentin 300 MG capsule    NEURONTIN    90 capsule    Take 1 tablet (300 mg) every night for 1-3 days, then 1 tablet twice daily for 1-3 days, then 1 tablet three times daily    Chronic pain syndrome       IBUPROFEN PO      Take 200 mg by mouth        MELATONIN PO      10 mg q hs    Absence of menstruation, ASCUS with positive high risk HPV cervical       metroNIDAZOLE 500 MG tablet    FLAGYL    14 tablet    Take 1 tablet (500 mg) by mouth 2 times daily    Bacterial vaginosis       ondansetron 4 MG ODT tab    ZOFRAN ODT    20 tablet    Take 1-2 tablets (4-8 mg) by mouth every 8 hours as needed for nausea    Nausea, Intractable chronic migraine without aura and with status  migrainosus       oxyCODONE-acetaminophen 5-325 MG per tablet    PERCOCET    18 tablet    Take 1 tablet by mouth 2 times daily as needed for moderate to severe pain maximum 2 tablet(s) per day    Intractable chronic migraine without aura and with status migrainosus       PRILOSEC PO      Take 20 mg by mouth    Absence of menstruation, ASCUS with positive high risk HPV cervical       SUMAtriptan 25 MG tablet    IMITREX    18 tablet    Take 1-2 tablets (25-50 mg) by mouth at onset of headache for migraine May repeat in 2 hours. Max 8 tablets/24 hours.    Other migraine without status migrainosus, not intractable       traZODone 100 MG tablet    DESYREL    90 tablet    Take 1-2 tablets (100-200 mg) by mouth At Bedtime    Insomnia due to other mental disorder       VISTARIL PO      Take 25 mg by mouth 1-2 prn    Absence of menstruation, ASCUS with positive high risk HPV cervical       vitamin D 2000 UNITS tablet      Take 1 tablet by mouth daily        ZOLOFT PO      Take 25 mg by mouth daily    Absence of menstruation, ASCUS with positive high risk HPV cervical

## 2018-01-02 NOTE — PROGRESS NOTES
SUBJECTIVE:   Karina Jenkins is a 32 year old female who presents to clinic today for the following health issues:      Depression and Anxiety Follow-Up    Status since last visit: Worsened with depression feels she may need an increase in her medication.     Other associated symptoms: previously saw psychiatrist while in inpatient treatment- thinks she has bi-polar disorder.    Complicating factors:     Significant life event: Yes-  Grandmother passed away     Current substance abuse: None    PHQ-9 Score and MyChart F/U Questions 8/3/2017 8/17/2017 1/2/2018   Total Score 18 8 20   Q9: Suicide Ideation Several days Not at all More than half the days     DOLORES-7 SCORE 8/3/2017 8/17/2017 1/2/2018   Total Score 15 3 18     In the past two weeks have you had thoughts of suicide or self-harm?  No.    Do you have concerns about your personal safety or the safety of others?   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   Requesting to have these forms completed in regards to back pain and issues as well as her brain injury.  Does not recall who previously  filled out these forms.     Patient reports she slipped and fell on the ice 3x over the past week.  Hitting her head 2 of the times and causing a migraine since.  Also injured her left knee and left arm.  Requesting refills on Percocet.    Also wanting Trazodone, Zofran, Hydroxyzine, Sertraline and Omeprazole refilled today.         Problem list and histories reviewed & adjusted, as indicated.  Additional history: as documented    Patient Active Problem List   Diagnosis     Heroin addiction (H)     Tobacco use     Multiple substance abuse     Dysplasia of cervix, low grade (EWELINA 1)     Other migraine without status migrainosus, not intractable     Past Surgical History:   Procedure Laterality Date     NO HISTORY OF SURGERY         Social History   Substance Use Topics     Smoking status: Current Every Day Smoker     Packs/day: 0.50     Smokeless tobacco: Never Used     Alcohol use No     Family History   Problem Relation Age of Onset     Breast Cancer No family hx of      Colon Cancer No family hx of          Current Outpatient Prescriptions   Medication Sig Dispense Refill     gabapentin (NEURONTIN) 300 MG capsule Take 1 tablet (300 mg) every night for 1-3 days, then 1 tablet twice daily for 1-3 days, then 1 tablet three times daily 90 capsule 0     ondansetron (ZOFRAN ODT) 4 MG ODT tab Take 1-2 tablets (4-8 mg) by mouth every 8 hours as needed for nausea 20 tablet 0     traZODone (DESYREL) 100 MG tablet Take 1-2 tablets (100-200 mg) by mouth At Bedtime 90 tablet 0     IBUPROFEN PO Take 200 mg by mouth       oxyCODONE-acetaminophen (PERCOCET) 5-325 MG per tablet Take 1 tablet by mouth 2 times daily as needed for moderate to severe pain maximum 2 tablet(s) per day 18 tablet 0     Sertraline HCl (ZOLOFT PO) Take 25 mg by mouth daily       Omeprazole (PRILOSEC PO) Take 20 mg by mouth       BusPIRone HCl (BUSPAR PO) Take 15 mg by mouth       Divalproex Sodium (DEPAKOTE PO) Take 500 mg by mouth       MELATONIN PO 10 mg q  "hs       DiphenhydrAMINE HCl (BENADRYL PO)        metroNIDAZOLE (FLAGYL) 500 MG tablet Take 1 tablet (500 mg) by mouth 2 times daily (Patient not taking: Reported on 1/2/2018) 14 tablet 0     Cholecalciferol (VITAMIN D) 2000 UNITS tablet Take 1 tablet by mouth daily       albuterol (PROAIR HFA/PROVENTIL HFA/VENTOLIN HFA) 108 (90 BASE) MCG/ACT Inhaler Inhale 2 puffs into the lungs every 6 hours       SUMAtriptan (IMITREX) 25 MG tablet Take 1-2 tablets (25-50 mg) by mouth at onset of headache for migraine May repeat in 2 hours. Max 8 tablets/24 hours. (Patient not taking: Reported on 1/2/2018) 18 tablet 1     HydrOXYzine Pamoate (VISTARIL PO) Take 25 mg by mouth 1-2 prn       Allergies   Allergen Reactions     Vicodin [Hydrocodone-Acetaminophen] Itching     BP Readings from Last 3 Encounters:   01/02/18 137/88   12/08/17 136/88   11/09/17 122/77    Wt Readings from Last 3 Encounters:   01/02/18 136 lb 9.6 oz (62 kg)   12/08/17 140 lb (63.5 kg)   11/09/17 145 lb (65.8 kg)                  Labs reviewed in EPIC        Reviewed and updated as needed this visit by clinical staffTobao  Meds       Reviewed and updated as needed this visit by Provider         ROS:  Constitutional, HEENT, cardiovascular, pulmonary, GI, , musculoskeletal, neuro, skin, endocrine and psych systems are negative, except as otherwise noted.      OBJECTIVE:   /88  Pulse 90  Temp 97.5  F (36.4  C) (Tympanic)  Ht 5' 3.5\" (1.613 m)  Wt 136 lb 9.6 oz (62 kg)  LMP 11/15/2017  Breastfeeding? No  BMI 23.82 kg/m2  Body mass index is 23.82 kg/(m^2).  GENERAL: healthy, alert and no distress  NECK: no adenopathy, no asymmetry, masses, or scars and thyroid normal to palpation  RESP: lungs clear to auscultation - no rales, rhonchi or wheezes  CV: regular rate and rhythm, normal S1 S2, no S3 or S4, no murmur, click or rub, no peripheral edema and peripheral pulses strong  ABDOMEN: soft, nontender, no hepatosplenomegaly, no masses and bowel " sounds normal  MS: no gross musculoskeletal defects noted, no edema POSITIVE left hand pain with movement, palpation, normal ROM, midline lumbar pain with palpation, left knee pain with palpation  NEURO: Normal strength and tone, mentation intact and speech normal  PSYCH: mentation appears normal, affect normal/bright    Diagnostic Test Results:  none           ASSESSMENT/PLAN:         ICD-10-CM    1. Fall, initial encounter W19.XXXA     Fell on ice x 3 since 12/24/17, neck pain, low back pain, left knee pain, left hand pain   2. Insomnia due to other mental disorder F51.05     F99    3. Nausea R11.0    4. 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 Neck pain M54.2 XR Cervical Spine 2/3 Views   6. Midline low back pain without sciatica, unspecified chronicity M54.5 XR Lumbar Spine 2/3 Views   7. Left knee pain, unspecified chronicity M25.562 XR Knee Left 3 Views   8. Pain of left hand M79.642 XR Hand Left G/E 3 Views   9. Chronic pain syndrome G89.4    10. Bipolar 2 disorder (H) F31.81    11. Anxiety F41.9    12. Gastroesophageal reflux disease, esophagitis presence not specified K21.9        See Patient Instructions    Nicole Jose NBA  [facility]

## 2018-01-02 NOTE — PATIENT INSTRUCTIONS
Back Pain (Acute or Chronic)    Back pain is one of the most common problems. The good news is that most people feel better in 1 to 2 weeks, and most of the rest in 1 to 2 months. Most people can remain active.  People experience and describe pain differently; not everyone is the same.    The pain can be sharp, stabbing, shooting, aching, cramping or burning.    Movement, standing, bending, lifting, sitting, or walking may worsen pain.    It can be localized to one spot or area, or it can be more generalized.    It can spread or radiate upwards, to the front, or go down your arms or legs (sciatica).    It can cause muscle spasm.  Most of the time, mechanical problems with the muscles or spine cause the pain. Mechanical problems are usually caused by an injury to the muscles or ligaments. While illness can cause back pain, it is usually not caused by a serious illness. Mechanical problems include:     Physical activity such as sports, exercise, work, or normal activity    Overexertion, lifting, pushing, pulling incorrectly or too aggressively    Sudden twisting, bending, or stretching from an accident, or accidental movement    Poor posture    Stretching or moving wrong, without noticing pain at the time    Poor coordination, lack of regular exercise (check with your doctor about this)    Spinal disc disease or arthritis    Stress  Pain can also be related to pregnancy, or illness like appendicitis, bladder or kidney infections, pelvic infections, and many other things.  Acute back pain usually gets better in 1 to 2 weeks. Back pain related to disk disease, arthritis in the spinal joints or spinal stenosis (narrowing of the spinal canal) can become chronic and last for months or years.  Unless you had a physical injury (for example, a car accident or fall) X-rays are usually not needed for the initial evaluation of back pain. If pain continues and does not respond to medical treatment, X-rays and other tests may be  needed.  Home care  Try these home care recommendations:    When in bed, try to find a position of comfort. A firm mattress is best. Try lying flat on your back with pillows under your knees. You can also try lying on your side with your knees bent up towards your chest and a pillow between your knees.    At first, do not try to stretch out the sore spots. If there is a strain, it is not like the good soreness you get after exercising without an injury. In this case, stretching may make it worse.    Avoid prolong sitting, long car rides, or travel. This puts more stress on the lower back than standing or walking.    During the first 24 to 72 hours after an acute injury or flare up of chronic back pain, apply an ice pack to the painful area for 20 minutes and then remove it for 20 minutes. Do this over a period of 60 to 90 minutes or several times a day. This will reduce swelling and pain. Wrap the ice pack in a thin towel or plastic to protect your skin.    You can start with ice, then switch to heat. Heat (hot shower, hot bath, or heating pad) reduces pain and works well for muscle spasms. Heat can be applied to the painful area for 20 minutes then remove it for 20 minutes. Do this over a period of 60 to 90 minutes or several times a day. Do not sleep on a heating pad. It can lead to skin burns or tissue damage.    You can alternate ice and heat therapy. Talk with your doctor about the best treatment for your back pain.    Therapeutic massage can help relax the back muscles without stretching them.    Be aware of safe lifting methods and do not lift anything without stretching first.  Medicines  Talk to your doctor before using medicine, especially if you have other medical problems or are taking other medicines.    You may use over-the-counter medicine as directed on the bottle to control pain, unless another pain medicine was prescribed. If you have chronic conditions like diabetes, liver or kidney disease,  "stomach ulcers, or gastrointestinal bleeding, or are taking blood thinners, talk to your doctor before taking any medicine.    Be careful if you are given a prescription medicines, narcotics, or medicine for muscle spasms. They can cause drowsiness, affect your coordination, reflexes, and judgement. Do not drive or operate heavy machinery.  Follow-up care  Follow up with your healthcare provider, or as advised.   A radiologist will review any X-rays that were taken. Your provide will notify you of any new findings that may affect your care.  Call 911  Call emergency services if any of the following occur:    Trouble breathing    Confusion    Very drowsy or trouble awakening    Fainting or loss of consciousness    Rapid or very slow heart rate    Loss of bowel or bladder control  When to seek medical advice  Call your healthcare provider right away if any of these occur:     Pain becomes worse or spreads to your legs    Weakness or numbness in one or both legs    Numbness in the groin or genital area  Date Last Reviewed: 7/1/2016 2000-2017 [employer]. [address] [place] [address]. All rights reserved. This information is not intended as a substitute for professional medical care. Always follow your healthcare professional's instructions.        The Cycle of Chronic Pain  Pain can affect virtually all parts of your life. Your sleep, mood, activity, and energy level can all be disrupted by pain. Being tired, depressed, and out of shape can make the pain worse and harder to cope with. So a \"pain cycle\" begins.        Note for family and friends  It can be difficult to help care for a friend or family member with chronic pain. Talk to a healthcare provider or mental health professional who can help you learn how to care for yourself and your loved one in a healthy manner.   Date Last Reviewed: 5/1/2017 2000-2017 [employer]. [address] [place] [address]. All " rights reserved. This information is not intended as a substitute for professional medical care. Always follow your healthcare professional's instructions.        Medicine for Pain  Medicines can help to block pain, decrease inflammation, and treat related problems. More than one medicine may be used to treat your pain. Medicines may be changed as you feel better, or if they cause side effects.  Medicines What they do Possible side effects   Non-opioid NSAIDs, aspirin, acetaminophen Reduce pain chemicals at the site of pain. NSAIDs can reduce joint and soft tissue inflammation. Nausea, stomach pain, ulcers, indigestion, bleeding, kidney, and liver problems. Certain NSAIDs may increase the risk for cardiovascular disease in some people. Talk with your healthcare provider.   Opioids (morphine and similar medicines often called narcotics) Reduce feelings or perception of pain. Used for moderate to severe pain. Nausea, vomiting, itching, drowsiness, constipation, slowed breathing   Other medicines (corticosteroids, antinausea, antidepressant, and antiseizure medicines) Reduce swelling, burning or tingling pain, or certain side effects of pain medicines, such as nausea or vomiting Your healthcare provider will explain the possible side effects of these medicines.   Anesthetics (local, injected) include lidocaine, benzocaine, and medicines used by anesthesiologists Stop pain signals from reaching the brain by blocking feeling in the treated area Nausea, low blood pressure, fever, slowed breathing, fainting, seizures, heart attack   When to call the healthcare provider  Call your healthcare provider right away (or have a family member call) if you have:    Unrelieved pain    Side effects, including constipation or uncontrolled nausea, that interfere with daily activities  If you have extreme sleepiness or breathing problems, call 911.   Other precautions    Ask your healthcare provider or pharmacist how to get rid of your  pain medicines safely when you stop using them.    Never share your pain medicines with anyone.    Store your medicines in a safe place so they can t be stolen. If you think your medicine has been stolen or lost, tell your healthcare provider right away.  Date Last Reviewed: 5/1/2017 2000-2017 The [employer]. [address] [place] [address], [place] [address]. All rights reserved. This information is not intended as a substitute for professional medical care. Always follow your healthcare professional's instructions.

## 2018-01-03 ASSESSMENT — ANXIETY QUESTIONNAIRES: GAD7 TOTAL SCORE: 18

## 2018-01-19 DIAGNOSIS — R87.810 ASCUS WITH POSITIVE HIGH RISK HPV CERVICAL: ICD-10-CM

## 2018-01-19 DIAGNOSIS — F43.23 ADJUSTMENT DISORDER WITH MIXED ANXIETY AND DEPRESSED MOOD: Primary | ICD-10-CM

## 2018-01-19 DIAGNOSIS — R87.610 ASCUS WITH POSITIVE HIGH RISK HPV CERVICAL: ICD-10-CM

## 2018-01-19 DIAGNOSIS — K21.9 GASTROESOPHAGEAL REFLUX DISEASE, ESOPHAGITIS PRESENCE NOT SPECIFIED: Primary | ICD-10-CM

## 2018-01-19 DIAGNOSIS — N91.2 ABSENCE OF MENSTRUATION: ICD-10-CM

## 2018-01-19 RX ORDER — SERTRALINE HYDROCHLORIDE 25 MG/1
25 TABLET, FILM COATED ORAL DAILY
Qty: 30 TABLET | Refills: 0 | Status: SHIPPED | OUTPATIENT
Start: 2018-01-19 | End: 2018-02-28

## 2018-01-19 NOTE — TELEPHONE ENCOUNTER
OMEPRAZOLE  Requested Prescriptions   I am unable to select from the meds/orders list.  Second request is dated 01/18/17.  Last Refill according to fax 11/22/17

## 2018-01-19 NOTE — TELEPHONE ENCOUNTER
Patient is requesting Sertraline 25 mg tablets, last filled 11/16/17.  I can not select from meds/orders screen.    Please advise

## 2018-01-19 NOTE — TELEPHONE ENCOUNTER
Routing refill request to provider for review/approval because:  Medication is reported/historical pended for completion and approval.  Krystal Malin RN

## 2018-01-22 ENCOUNTER — OFFICE VISIT (OUTPATIENT)
Dept: FAMILY MEDICINE | Facility: CLINIC | Age: 33
End: 2018-01-22
Payer: MEDICAID

## 2018-01-22 VITALS
WEIGHT: 137 LBS | DIASTOLIC BLOOD PRESSURE: 78 MMHG | OXYGEN SATURATION: 99 % | SYSTOLIC BLOOD PRESSURE: 124 MMHG | HEART RATE: 92 BPM | TEMPERATURE: 98.9 F | BODY MASS INDEX: 23.89 KG/M2

## 2018-01-22 DIAGNOSIS — G43.809 OTHER MIGRAINE WITHOUT STATUS MIGRAINOSUS, NOT INTRACTABLE: ICD-10-CM

## 2018-01-22 DIAGNOSIS — J32.9 SINUSITIS, UNSPECIFIED CHRONICITY, UNSPECIFIED LOCATION: Primary | ICD-10-CM

## 2018-01-22 DIAGNOSIS — R22.9 SKIN NODULE: ICD-10-CM

## 2018-01-22 PROCEDURE — 99214 OFFICE O/P EST MOD 30 MIN: CPT | Performed by: FAMILY MEDICINE

## 2018-01-22 RX ORDER — GABAPENTIN 800 MG/1
800 TABLET ORAL 3 TIMES DAILY
Qty: 360 TABLET | Refills: 3 | Status: SHIPPED | OUTPATIENT
Start: 2018-01-22

## 2018-01-22 RX ORDER — AMOXICILLIN 500 MG/1
1000 TABLET, FILM COATED ORAL 2 TIMES DAILY
Qty: 28 TABLET | Refills: 0 | Status: SHIPPED | OUTPATIENT
Start: 2018-01-22 | End: 2018-01-29

## 2018-01-22 NOTE — PATIENT INSTRUCTIONS
1. Take Amoxicillin as prescribed.    2. If the skin sore does not get better in one week, please set up an appointment with our surgeon. Avoid picking at it.    3. Set up neurology appointment for your migraines.

## 2018-01-22 NOTE — NURSING NOTE
"Chief Complaint   Patient presents with     Derm Problem     cyst by groin     Headache       Initial /78 (Cuff Size: Adult Regular)  Pulse 92  Temp 98.9  F (37.2  C) (Oral)  Wt 137 lb (62.1 kg)  SpO2 99%  BMI 23.89 kg/m2 Estimated body mass index is 23.89 kg/(m^2) as calculated from the following:    Height as of 1/2/18: 5' 3.5\" (1.613 m).    Weight as of this encounter: 137 lb (62.1 kg).  Medication Reconciliation: complete    Lexi Duarte LPN    "

## 2018-01-22 NOTE — MR AVS SNAPSHOT
After Visit Summary   1/22/2018    Karina Jenkins    MRN: [id number]           Patient Information     Date Of Birth          1985        Visit Information        Provider Department      1/22/2018 10:50 AM Deshawn Duran MD [facility]        Today's Diagnoses     Sinusitis, unspecified chronicity, unspecified location    -  1    Other migraine without status migrainosus, not intractable        Skin nodule          Care Instructions    1. Take Amoxicillin as prescribed.    2. If the skin sore does not get better in one week, please set up an appointment with our surgeon. Avoid picking at it.    3. Set up neurology appointment for your migraines.          Follow-ups after your visit        Additional Services     GENERAL SURG ADULT REFERRAL       Your provider has referred you to: [facility]: [facility] [phone]   [email or web address]    Please be aware that coverage of these services is subject to the terms and limitations of your health insurance plan.  Call member services at your health plan with any benefit or coverage questions.      Please bring the following to your appointment:  >>   Any x-rays, CTs or MRIs which have been performed.  Contact the facility where they were done to arrange for  prior to your scheduled appointment.  Any new CT, MRI or other procedures ordered by your specialist must be performed at a [place] facility or coordinated by your clinic's referral office.    >>   List of current medications   >>   This referral request   >>   Any documents/labs given to you for this referral            NEUROLOGY ADULT REFERRAL       Your provider has referred you to: [facility]: [facility] [place] [phone]   [email or web address]    Please be aware that coverage of these services is subject to the terms and limitations of your health insurance  "plan.  Call member services at your health plan with any benefit or coverage questions.      Please bring the following to your appointment:    >>   Any x-rays, CTs or MRIs which have been performed.  Contact the facility where they were done to arrange for  prior to your scheduled appointment.  Any new CT, MRI or other procedures ordered by your specialist must be performed at a [place] facility or coordinated by your clinic's referral office.    >>   List of current medications   >>   This referral request   >>   Any documents/labs given to you for this referral                  Who to contact     If you have questions or need follow up information about today's clinic visit or your schedule please contact [facility] directly at [phone].  Normal or non-critical lab and imaging results will be communicated to you by MyChart, letter or phone within 4 business days after the clinic has received the results. If you do not hear from us within 7 days, please contact the clinic through MyChart or phone. If you have a critical or abnormal lab result, we will notify you by phone as soon as possible.  Submit refill requests through [employer] or call your pharmacy and they will forward the refill request to us. Please allow 3 business days for your refill to be completed.          Additional Information About Your Visit        TVTYhart Information     Urlist lets you send messages to your doctor, view your test results, renew your prescriptions, schedule appointments and more. To sign up, go to [email or web address] . Click on \"Log in\" on the left side of the screen, which will take you to the Welcome page. Then click on \"Sign up Now\" on the right side of the page.     You will be asked to enter the access code listed below, as well as some personal information. Please follow the directions to create your username and password.     Your access code is: [email or web address]  Expires: 2/7/2018 12:01 PM   "   Your access code will  in 90 days. If you need help or a new code, please call your [place] clinic or [phone].        Care EveryWhere ID     This is your Care EveryWhere ID. This could be used by other organizations to access your [facility] medical records  [id number]        Your Vitals Were     Pulse Temperature Pulse Oximetry BMI (Body Mass Index)          92 98.9  F (37.2  C) (Oral) 99% 23.89 kg/m2         Blood Pressure from Last 3 Encounters:   18 124/78   18 137/88   17 136/88    Weight from Last 3 Encounters:   18 137 lb (62.1 kg)   18 136 lb 9.6 oz (62 kg)   17 140 lb (63.5 kg)              We Performed the Following     GENERAL SURG ADULT REFERRAL     NEUROLOGY ADULT REFERRAL          Today's Medication Changes          These changes are accurate as of: 18 11:23 AM.  If you have any questions, ask your nurse or doctor.               Start taking these medicines.        Dose/Directions    amoxicillin 500 MG tablet   Commonly known as:  AMOXIL   Used for:  Sinusitis, unspecified chronicity, unspecified location   Started by:  Deshawn Duran MD        Dose:  1000 mg   Take 2 tablets (1,000 mg) by mouth 2 times daily for 7 days   Quantity:  28 tablet   Refills:  0            Where to get your medicines      These medications were sent to [facility] [address], [facility] [address]  [address] [address] [address]     Phone:  [phone]     amoxicillin 500 MG tablet                Primary Care Provider Office Phone # Fax #    Nicole JAMSHID Jose [phone]       [address] [address] [place] [address]        Equal Access to Services     [facility] AH: Hadii samantha sanderso Somarsha, waaxda luqadaha, qaybta kaalmada adeegyada, ervin escobar. So [facility] [phone].    ATENCIÓN: Si habla español, tiene a cortez disposición servicios gratuitos de asistencia lingüística. Llame al  [phone].    We comply with applicable federal civil rights laws and [place] laws. We do not discriminate on the basis of race, color, national origin, age, disability, sex, sexual orientation, or gender identity.            Thank you!     Thank you for choosing [facility]  for your care. Our goal is always to provide you with excellent care. Hearing back from our patients is one way we can continue to improve our services. Please take a few minutes to complete the written survey that you may receive in the mail after your visit with us. Thank you!             Your Updated Medication List - Protect others around you: Learn how to safely use, store and throw away your medicines at www.disposemymeds.org.          This list is accurate as of: 1/22/18 11:23 AM.  Always use your most recent med list.                   Brand Name Dispense Instructions for use Diagnosis    albuterol 108 (90 BASE) MCG/ACT Inhaler    PROAIR HFA/PROVENTIL HFA/VENTOLIN HFA     Inhale 2 puffs into the lungs every 6 hours        amoxicillin 500 MG tablet    AMOXIL    28 tablet    Take 2 tablets (1,000 mg) by mouth 2 times daily for 7 days    Sinusitis, unspecified chronicity, unspecified location       BENADRYL PO       Absence of menstruation, ASCUS with positive high risk HPV cervical       BUSPAR PO      Take 15 mg by mouth    Absence of menstruation, ASCUS with positive high risk HPV cervical       DEPAKOTE PO      Take 500 mg by mouth    Absence of menstruation, ASCUS with positive high risk HPV cervical       gabapentin 300 MG capsule    NEURONTIN    90 capsule    Take 1 tablet (300 mg) every night for 1-3 days, then 1 tablet twice daily for 1-3 days, then 1 tablet three times daily    Chronic pain syndrome       IBUPROFEN PO      Take 200 mg by mouth        MELATONIN PO      10 mg q hs    Absence of menstruation, ASCUS with positive high risk HPV cervical       metroNIDAZOLE 500 MG tablet    FLAGYL    14 tablet    Take  1 tablet (500 mg) by mouth 2 times daily    Bacterial vaginosis       omeprazole 20 MG CR capsule    priLOSEC    90 capsule    Take 1 capsule (20 mg) by mouth daily    Gastroesophageal reflux disease, esophagitis presence not specified       ondansetron 4 MG ODT tab    ZOFRAN ODT    20 tablet    Take 1-2 tablets (4-8 mg) by mouth every 8 hours as needed for nausea    Nausea, Intractable chronic migraine without aura and with status migrainosus       oxyCODONE-acetaminophen 5-325 MG per tablet    PERCOCET    18 tablet    Take 1 tablet by mouth 2 times daily as needed for moderate to severe pain maximum 2 tablet(s) per day    Intractable chronic migraine without aura and with status migrainosus       sertraline 25 MG tablet    ZOLOFT    30 tablet    Take 1 tablet (25 mg) by mouth daily    Adjustment disorder with mixed anxiety and depressed mood       SUMAtriptan 25 MG tablet    IMITREX    18 tablet    Take 1-2 tablets (25-50 mg) by mouth at onset of headache for migraine May repeat in 2 hours. Max 8 tablets/24 hours.    Other migraine without status migrainosus, not intractable       traZODone 100 MG tablet    DESYREL    90 tablet    Take 1-2 tablets (100-200 mg) by mouth At Bedtime    Insomnia due to other mental disorder       VISTARIL PO      Take 25 mg by mouth 1-2 prn    Absence of menstruation, ASCUS with positive high risk HPV cervical       vitamin D 2000 UNITS tablet      Take 1 tablet by mouth daily

## 2018-01-22 NOTE — PROGRESS NOTES
HPI:    I am seeing Karina Jenkins for the 1st time. she is a 32 year old female here to discuss:    Sinusitis - symptoms present for one month. These include nasal/sinus congestion and pain, cough. No ear pain or sore throat. No fevers, chills, sweats or shortness of breath. She is a smoker.  Evaluation and treatment:    We will treat with Amoxicillin.    Headaches - she tells me she has migraine headaches. The pain is severe and goes from the head all the way to the feet.  Evaluation and treatment:    She says she has tried many different medications without much relief.   She says Percocet works and requests refill. I told her that was not a good idea.   I refilled her Gabapentin - she requested 900 mg tid - for ease I refilled 800 mg tid.   I referred her to neurology.    Skin nodule - right groin area for about 2 weeks. She has been picking at it. There is mild pain and slight d/c when she squeezes it.  Evaluation and treatment:    Take Amoxicillin as above.   Avoid picking at it.   If it it does not improve, see surgeon.    ROS:    Const: No fevers, weight changes or night sweats recently.  ENT: No runny nose, sore throat or ear pain.  Resp: No cough or shortness of breath.  CV: No chest pain, dizziness or cardiac palpitations.  GI: No nausea, vomiting, diarrhea or constipation. Denies blood in stools or black stools.  : No dysuria, frequency or hematuria.  The rest of the ROS negative, other than listed on HPI    Social History     Social History     Marital status: Single     Spouse name: N/A     Number of children: N/A     Years of education: N/A     Occupational History     Not on file.     Social History Main Topics     Smoking status: Current Every Day Smoker     Packs/day: 0.50     Smokeless tobacco: Never Used     Alcohol use No     Drug use: Yes     Special: Heroin      Comment: heroin     Sexual activity: Yes     Partners: Male     Birth control/ protection: None     Other Topics Concern      Not on file     Social History Narrative     Exam:    /78 (Cuff Size: Adult Regular)  Pulse 92  Temp 98.9  F (37.2  C) (Oral)  Wt 137 lb (62.1 kg)  SpO2 99%  BMI 23.89 kg/m2      Gen: Healthy appearing female in no acute distress  ENT: TM's normal. Oropharynx normal. Oral mucosa moist without lesions.  Eyes: Conjunctiva and sclera normal. Pupils react normally to light. No nystagmus.  Neck: No enlarged lymph nodes, thyromegally or other masses.  Lungs: Good air movement and otherwise clear.  CV: Heart RRR with no murmurs. No JVD, carotid bruits or leg edema.    The following exams were done in the presence of Lexi Duarte LPN.    Skin: the right groin area has a ulcerated nodule about 8 mm. No d/c. No surrounding erythema. No fluctuance.    Assessment and Plan - Decision Making    1. Sinusitis, unspecified chronicity, unspecified location  Per HPI  - amoxicillin (AMOXIL) 500 MG tablet; Take 2 tablets (1,000 mg) by mouth 2 times daily for 7 days  Dispense: 28 tablet; Refill: 0    2. Other migraine without status migrainosus, not intractable  Per HPI  - NEUROLOGY ADULT REFERRAL  - gabapentin (NEURONTIN) 800 MG tablet; Take 1 tablet (800 mg) by mouth 3 times daily  Dispense: 360 tablet; Refill: 3    3. Skin nodule  Per HPI  - GENERAL SURG ADULT REFERRAL      Written instructions given as follows:    Patient Instructions   1. Take Amoxicillin as prescribed.    2. If the skin sore does not get better in one week, please set up an appointment with our surgeon. Avoid picking at it.    3. Set up neurology appointment for your migraines.

## 2018-02-06 PROBLEM — G43.711 INTRACTABLE CHRONIC MIGRAINE WITHOUT AURA AND WITH STATUS MIGRAINOSUS: Status: ACTIVE | Noted: 2018-02-06

## 2018-02-06 PROBLEM — F31.81 BIPOLAR 2 DISORDER (H): Status: ACTIVE | Noted: 2018-02-06

## 2018-02-06 PROBLEM — G89.4 CHRONIC PAIN SYNDROME: Status: ACTIVE | Noted: 2018-02-06

## 2018-02-06 PROBLEM — F41.9 ANXIETY: Status: ACTIVE | Noted: 2018-02-06

## 2018-02-07 NOTE — TELEPHONE ENCOUNTER
APPT INFO    Date /Time: 2/14/18 8:30AM    Reason for Appt: migraine   Ref Provider/Clinic: [facility]   Are there internal records? Yes/No?  IF YES, list clinic names: YES     [facility] 1/22/18, 1/2/18, 2017  [facility] 11/9/17  [facility] brain 9/21/17   Are there outside records? Yes/No? NO   Patient Contact (Y/N) & Call Details: NO - referral   Action: Reviewed records

## 2018-02-14 ENCOUNTER — PRE VISIT (OUTPATIENT)
Dept: NEUROLOGY | Facility: CLINIC | Age: 33
End: 2018-02-14

## 2018-02-28 DIAGNOSIS — R11.0 NAUSEA: ICD-10-CM

## 2018-02-28 DIAGNOSIS — G43.711 INTRACTABLE CHRONIC MIGRAINE WITHOUT AURA AND WITH STATUS MIGRAINOSUS: ICD-10-CM

## 2018-02-28 DIAGNOSIS — F43.23 ADJUSTMENT DISORDER WITH MIXED ANXIETY AND DEPRESSED MOOD: ICD-10-CM

## 2018-02-28 NOTE — TELEPHONE ENCOUNTER
"Requested Prescriptions   Pending Prescriptions Disp Refills     sertraline (ZOLOFT) 25 MG tablet [Pharmacy Med Name: SERTRALINE 25MG TABLETS] 30 tablet 0    Last Written Prescription Date:  1-19-18  Last Fill Quantity: 30,  # refills: 0   Last office visit: 1/22/2018 with prescribing provider:  1-22-18   Future Office Visit:   Sig: TAKE 1 TABLET BY MOUTH DAILY    SSRIs Protocol Passed    2/28/2018 11:09 AM       Passed - Recent or future visit with authorizing provider    Patient had office visit in the last year or has a visit in the next 30 days with authorizing provider.  See \"Patient Info\" tab in inbasket, or \"Choose Columns\" in Meds & Orders section of the refill encounter.            Passed - Patient is age 18 or older       Passed - No active pregnancy on record       Passed - No positive pregnancy test in last 12 months        ondansetron (ZOFRAN-ODT) 4 MG ODT tab [Pharmacy Med Name: ONDANSETRON ODT 4MG TABLETS] 20 tablet 0    Last Written Prescription Date:  1-20-18  Last Fill Quantity: 20,  # refills: 0   Last office visit: 1/22/2018 with prescribing provider:  1-22-18   Future Office Visit:   Sig: DISSOLVE 1 TO 2 TABLETS(4 TO 8 MG) ON THE TONGUE EVERY 8 HOURS AS NEEDED FOR NAUSEA     Antivertigo/Antiemetic Agents Passed    2/28/2018 11:09 AM       Passed - Recent or future visit with authorizing provider's specialty    Patient had office visit in the last year or has a visit in the next 30 days with authorizing provider.  See \"Patient Info\" tab in inbasket, or \"Choose Columns\" in Meds & Orders section of the refill encounter.            Passed - Patient is 18 years of age or older        "

## 2018-02-28 NOTE — TELEPHONE ENCOUNTER
Routing refill request to provider for review/approval because:  Labs out of range:  phq9            PHQ-9 SCORE 8/3/2017 8/17/2017 1/2/2018   Total Score 18 8 20

## 2018-03-01 RX ORDER — SERTRALINE HYDROCHLORIDE 25 MG/1
TABLET, FILM COATED ORAL
Qty: 30 TABLET | Refills: 0 | Status: SHIPPED | OUTPATIENT
Start: 2018-03-01 | End: 2018-04-26

## 2018-03-01 RX ORDER — ONDANSETRON 4 MG/1
TABLET, ORALLY DISINTEGRATING ORAL
Qty: 20 TABLET | Refills: 0 | Status: SHIPPED | OUTPATIENT
Start: 2018-03-01

## 2018-04-30 DIAGNOSIS — R87.610 ASCUS WITH POSITIVE HIGH RISK HPV CERVICAL: ICD-10-CM

## 2018-04-30 DIAGNOSIS — N91.2 ABSENCE OF MENSTRUATION: ICD-10-CM

## 2018-04-30 DIAGNOSIS — R87.810 ASCUS WITH POSITIVE HIGH RISK HPV CERVICAL: ICD-10-CM

## 2018-05-01 RX ORDER — HYDROXYZINE PAMOATE 25 MG/1
CAPSULE ORAL
Qty: 100 CAPSULE | Refills: 0 | Status: SHIPPED | OUTPATIENT
Start: 2018-05-01

## 2018-05-01 NOTE — TELEPHONE ENCOUNTER
Routing refill request to provider for review/approval because:  Not found under RN protocol.  Krystal Malin RN

## 2018-05-01 NOTE — TELEPHONE ENCOUNTER
"Requested Prescriptions   Pending Prescriptions Disp Refills     hydrOXYzine (VISTARIL) 25 MG capsule [Pharmacy Med Name: HYDROXYZINE PAMOATE 25MG CAPSULES] 100 capsule 0    Last Written Prescription Date:  11/6/17  Last Fill Quantity: 100,  # refills: 0   Last office visit: 1/22/2018 with prescribing provider:  1/22/18 CARINE Duran   Future Office Visit:   Sig: TAKE 1 TO 2 CAPSULES BY MOUTH EVERY 4 HOURS AS NEEDED    Antihistamines Protocol Passed    4/30/2018  7:29 PM       Passed - Recent (12 mo) or future (30 days) visit within the authorizing provider's specialty    Patient had office visit in the last 12 months or has a visit in the next 30 days with authorizing provider or within the authorizing provider's specialty.  See \"Patient Info\" tab in inbasket, or \"Choose Columns\" in Meds & Orders section of the refill encounter.           Passed - Patient is age 3 or older    Apply age and/or weight-based dosing for peds patients age 3 and older.    Forward request to provider for patients under the age of 3.          "

## 2018-07-03 ENCOUNTER — HEALTH MAINTENANCE LETTER (OUTPATIENT)
Age: 33
End: 2018-07-03

## 2018-07-17 ENCOUNTER — HEALTH MAINTENANCE LETTER (OUTPATIENT)
Age: 33
End: 2018-07-17

## 2020-04-19 NOTE — PROGRESS NOTES
SUBJECTIVE:   Karina Jenkins is a 32 year old female who presents to clinic today for the following health issues:    Migraine Follow-Up    Headaches symptoms:  Stable; same as previous but constant headaches    Frequency: daily     Duration of headaches:  All day    Able to do normal daily activities/work with migraines: No     Rescue/Relief medication:ibuprofen (Advil, Motrin) and Tylenol              Effectiveness: minor relief    Preventative medication: None    Neurologic complications: No new stroke-like symptoms, loss of vision or speech, numbness or weakness    In the past 4 weeks, how often have you gone to Urgent Care or the emergency room because of your headaches?  0  Pt has tried many treatments for her migraines, she brings along 10+ bottles of medication. Only thing that helps her migraines is Percocet.     Patient is having trouble sleeping due to migraines.     Rash      Duration: 8/17/17    Description  Location: buttocks, face  Itching: moderate    Intensity:  moderate    Accompanying signs and symptoms: red blotches, groin lump    Precipitating or alleviating factors:  New exposures:  medication   Recent travel: no      Therapies tried and outcome: none              Problem list and histories reviewed & adjusted, as indicated.  Additional history: as documented    Patient Active Problem List   Diagnosis     Heroin addiction (H)     Tobacco use     Multiple substance abuse     Dysplasia of cervix, low grade (EWELINA 1)     Other migraine without status migrainosus, not intractable     Past Surgical History:   Procedure Laterality Date     NO HISTORY OF SURGERY         Social History   Substance Use Topics     Smoking status: Current Every Day Smoker     Packs/day: 0.50     Smokeless tobacco: Never Used     Alcohol use No     Family History   Problem Relation Age of Onset     Breast Cancer No family hx of      Colon Cancer No family hx of          Current Outpatient Prescriptions   Medication  Presents as clean, dry, & intact, no bleeding and no hematoma. Sig Dispense Refill     ondansetron (ZOFRAN ODT) 4 MG ODT tab Take 1-2 tablets (4-8 mg) by mouth every 8 hours as needed for nausea 20 tablet 1     doxycycline Monohydrate 100 MG CAPS Take 1 capsule (100 mg) by mouth 2 times daily for 10 days 20 capsule 0     oxyCODONE-acetaminophen (PERCOCET) 5-325 MG per tablet Take 1 tablet by mouth 2 times daily as needed for moderate to severe pain maximum 2 tablet(s) per day 18 tablet 0     ondansetron (ZOFRAN) 4 MG tablet Take 1 tablet (4 mg) by mouth every 12 hours as needed for nausea 18 tablet 0     Cholecalciferol (VITAMIN D) 2000 UNITS tablet Take 1 tablet by mouth daily       albuterol (PROAIR HFA/PROVENTIL HFA/VENTOLIN HFA) 108 (90 BASE) MCG/ACT Inhaler Inhale 2 puffs into the lungs every 6 hours       SUMAtriptan (IMITREX) 25 MG tablet Take 1-2 tablets (25-50 mg) by mouth at onset of headache for migraine May repeat in 2 hours. Max 8 tablets/24 hours. 18 tablet 1     traZODone (DESYREL) 100 MG tablet Take 1-2 tablets (100-200 mg) by mouth At Bedtime 90 tablet 0     Sertraline HCl (ZOLOFT PO) Take 25 mg by mouth daily       Omeprazole (PRILOSEC PO) Take 20 mg by mouth       BusPIRone HCl (BUSPAR PO) Take 15 mg by mouth       Divalproex Sodium (DEPAKOTE PO) Take 500 mg by mouth       HydrOXYzine Pamoate (VISTARIL PO) Take 25 mg by mouth 1-2 prn       MELATONIN PO 10 mg q hs       DiphenhydrAMINE HCl (BENADRYL PO)        alum & mag hydroxide-simethicone (MYLANTA/MAALOX) 200-200-20 MG/5ML SUSP suspension Take 15 mLs by mouth every 4 hours as needed for indigestion       Methocarbamol (ROBAXIN PO) Take 500 mg by mouth 3 times daily       gabapentin (NEURONTIN) 300 MG capsule Take 1 tablet (300 mg) every night for 1-3 days, then 1 tablet twice daily for 1-3 days, then 1 tablet three times daily 90 capsule 3     Allergies   Allergen Reactions     Vicodin [Hydrocodone-Acetaminophen] Itching     BP Readings from Last 3 Encounters:   09/08/17 119/78   08/17/17 120/85   08/03/17  131/87    Wt Readings from Last 3 Encounters:   09/08/17 138 lb (62.6 kg)   08/17/17 135 lb (61.2 kg)   08/03/17 135 lb 12.8 oz (61.6 kg)                  Labs reviewed in EPIC        Reviewed and updated as needed this visit by clinical staffTobacco  Allergies  Meds  Med Hx  Surg Hx  Fam Hx  Soc Hx      Reviewed and updated as needed this visit by Provider         ROS:  Constitutional, HEENT, cardiovascular, pulmonary, GI, , musculoskeletal, neuro, skin, endocrine and psych systems are negative, except as otherwise noted.      OBJECTIVE:   /78  Pulse 95  Temp 99.4  F (37.4  C) (Oral)  Wt 138 lb (62.6 kg)  SpO2 99%  BMI 24.06 kg/m2  Body mass index is 24.06 kg/(m^2).  GENERAL: healthy, alert and no distress  EYES: Eyes grossly normal to inspection, PERRL and conjunctivae and sclerae normal  HENT: ear canals and TM's normal, nose and mouth without ulcers or lesions  NECK: no adenopathy, no asymmetry, masses, or scars and thyroid normal to palpation  RESP: lungs clear to auscultation - no rales, rhonchi or wheezes  CV: regular rate and rhythm, normal S1 S2, no S3 or S4, no murmur, click or rub, no peripheral edema and peripheral pulses strong  NEURO: Normal strength and tone, mentation intact and speech normal, cranial nerves intact  PSYCH: mentation appears normal, affect normal/bright    Diagnostic Test Results:  See orders    ASSESSMENT/PLAN:   [place] [facility] website checked      ICD-10-CM    1. Intractable chronic migraine without aura and with status migrainosus G43.711 ondansetron (ZOFRAN ODT) 4 MG ODT tab     NEUROLOGY ADULT REFERRAL     MR Brain w/o & w Contrast     [facility] REFERRAL     oxyCODONE-acetaminophen (PERCOCET) 5-325 MG per tablet     Drug Abuse Screen Panel 13, Urine (Pain Care Package)     DISCONTINUED: oxyCODONE-acetaminophen (PERCOCET) 5-325 MG per tablet     CANCELED: Drug Abuse Screen Panel 13, Urine (Pain Care Package)    chronic since age 5, pt reports TBI  d/t carnival ride   2. Nausea R11.0 ondansetron (ZOFRAN ODT) 4 MG ODT tab   3. Local infection of skin and subcutaneous tissue L08.9 doxycycline Monohydrate 100 MG CAPS       See Patient Instructions: Schedule your MRI and schedule with Neurology and Pain management.  I need pain managements recommendations for further treatment of your chronic migraines, as percocet is not usually used for migraines.  Follow up as needed for additional health concerns.     Nicole Jose, KYRIE  [facility], [place]